# Patient Record
Sex: MALE | Race: ASIAN | NOT HISPANIC OR LATINO | Employment: FULL TIME | URBAN - METROPOLITAN AREA
[De-identification: names, ages, dates, MRNs, and addresses within clinical notes are randomized per-mention and may not be internally consistent; named-entity substitution may affect disease eponyms.]

---

## 2017-08-09 ENCOUNTER — OFFICE VISIT - HEALTHEAST (OUTPATIENT)
Dept: FAMILY MEDICINE | Facility: CLINIC | Age: 47
End: 2017-08-09

## 2017-08-09 DIAGNOSIS — J45.909 REACTIVE AIRWAY DISEASE, UNSPECIFIED ASTHMA SEVERITY, UNCOMPLICATED: ICD-10-CM

## 2017-08-09 DIAGNOSIS — J02.0 STREP PHARYNGITIS: ICD-10-CM

## 2017-08-09 DIAGNOSIS — J02.9 SORE THROAT: ICD-10-CM

## 2017-08-09 ASSESSMENT — MIFFLIN-ST. JEOR: SCORE: 1252.38

## 2017-11-07 ENCOUNTER — OFFICE VISIT - HEALTHEAST (OUTPATIENT)
Dept: FAMILY MEDICINE | Facility: CLINIC | Age: 47
End: 2017-11-07

## 2017-11-07 DIAGNOSIS — R53.83 FATIGUE: ICD-10-CM

## 2017-11-07 DIAGNOSIS — J40 BRONCHITIS: ICD-10-CM

## 2017-11-07 DIAGNOSIS — Z00.00 PREVENTATIVE HEALTH CARE: ICD-10-CM

## 2017-11-07 DIAGNOSIS — R05.9 COUGH: ICD-10-CM

## 2017-11-07 DIAGNOSIS — R09.1 PLEURISY: ICD-10-CM

## 2017-11-07 LAB
CHOLEST SERPL-MCNC: 162 MG/DL
FASTING STATUS PATIENT QL REPORTED: YES
HDLC SERPL-MCNC: 40 MG/DL
LDLC SERPL CALC-MCNC: 110 MG/DL
TRIGL SERPL-MCNC: 62 MG/DL

## 2018-02-22 ENCOUNTER — COMMUNICATION - HEALTHEAST (OUTPATIENT)
Dept: FAMILY MEDICINE | Facility: CLINIC | Age: 48
End: 2018-02-22

## 2018-02-22 DIAGNOSIS — R09.1 PLEURISY: ICD-10-CM

## 2018-03-26 ENCOUNTER — OFFICE VISIT - HEALTHEAST (OUTPATIENT)
Dept: FAMILY MEDICINE | Facility: CLINIC | Age: 48
End: 2018-03-26

## 2018-03-26 ENCOUNTER — AMBULATORY - HEALTHEAST (OUTPATIENT)
Dept: PULMONOLOGY | Facility: OTHER | Age: 48
End: 2018-03-26

## 2018-03-26 DIAGNOSIS — J98.01 BRONCHOSPASM: ICD-10-CM

## 2018-03-26 DIAGNOSIS — R06.02 SOB (SHORTNESS OF BREATH): ICD-10-CM

## 2018-03-26 DIAGNOSIS — R06.02 SHORTNESS OF BREATH: ICD-10-CM

## 2018-03-26 DIAGNOSIS — R09.1 PLEURISY: ICD-10-CM

## 2018-03-26 ASSESSMENT — MIFFLIN-ST. JEOR: SCORE: 1281.3

## 2018-05-15 ENCOUNTER — RECORDS - HEALTHEAST (OUTPATIENT)
Dept: PULMONOLOGY | Facility: OTHER | Age: 48
End: 2018-05-15

## 2018-05-15 ENCOUNTER — RECORDS - HEALTHEAST (OUTPATIENT)
Dept: ADMINISTRATIVE | Facility: OTHER | Age: 48
End: 2018-05-15

## 2018-05-15 ENCOUNTER — OFFICE VISIT - HEALTHEAST (OUTPATIENT)
Dept: PULMONOLOGY | Facility: OTHER | Age: 48
End: 2018-05-15

## 2018-05-15 DIAGNOSIS — R05.9 COUGH: ICD-10-CM

## 2018-05-15 DIAGNOSIS — R06.02 SHORTNESS OF BREATH: ICD-10-CM

## 2018-05-15 LAB — HGB BLD-MCNC: 16.1 G/DL

## 2018-05-15 ASSESSMENT — MIFFLIN-ST. JEOR: SCORE: 1288.11

## 2018-05-24 ENCOUNTER — HOSPITAL ENCOUNTER (OUTPATIENT)
Dept: RADIOLOGY | Facility: HOSPITAL | Age: 48
Discharge: HOME | End: 2018-05-24
Attending: INTERNAL MEDICINE

## 2018-05-24 DIAGNOSIS — R05.9 COUGH: ICD-10-CM

## 2018-12-03 ENCOUNTER — COMMUNICATION - HEALTHEAST (OUTPATIENT)
Dept: ADMINISTRATIVE | Facility: CLINIC | Age: 48
End: 2018-12-03

## 2018-12-03 ENCOUNTER — OFFICE VISIT - HEALTHEAST (OUTPATIENT)
Dept: FAMILY MEDICINE | Facility: CLINIC | Age: 48
End: 2018-12-03

## 2018-12-03 DIAGNOSIS — R73.03 PREDIABETES: ICD-10-CM

## 2018-12-03 DIAGNOSIS — J96.01 ACUTE RESPIRATORY FAILURE WITH HYPOXIA (H): ICD-10-CM

## 2018-12-03 ASSESSMENT — MIFFLIN-ST. JEOR: SCORE: 1283.57

## 2018-12-04 ENCOUNTER — COMMUNICATION - HEALTHEAST (OUTPATIENT)
Dept: CARE COORDINATION | Facility: CLINIC | Age: 48
End: 2018-12-04

## 2018-12-14 ENCOUNTER — OFFICE VISIT - HEALTHEAST (OUTPATIENT)
Dept: FAMILY MEDICINE | Facility: CLINIC | Age: 48
End: 2018-12-14

## 2018-12-14 DIAGNOSIS — Z76.0 ENCOUNTER FOR MEDICATION REFILL: ICD-10-CM

## 2018-12-14 DIAGNOSIS — J45.41 MODERATE PERSISTENT ASTHMA WITH ACUTE EXACERBATION: ICD-10-CM

## 2018-12-14 ASSESSMENT — MIFFLIN-ST. JEOR: SCORE: 1290.37

## 2018-12-17 ENCOUNTER — AMBULATORY - HEALTHEAST (OUTPATIENT)
Dept: FAMILY MEDICINE | Facility: CLINIC | Age: 48
End: 2018-12-17

## 2019-01-10 ENCOUNTER — OFFICE VISIT - HEALTHEAST (OUTPATIENT)
Dept: FAMILY MEDICINE | Facility: CLINIC | Age: 49
End: 2019-01-10

## 2019-01-10 ENCOUNTER — AMBULATORY - HEALTHEAST (OUTPATIENT)
Dept: FAMILY MEDICINE | Facility: CLINIC | Age: 49
End: 2019-01-10

## 2019-01-10 DIAGNOSIS — Z92.89 HOSPITALIZATION WITHIN LAST 30 DAYS: ICD-10-CM

## 2019-01-10 ASSESSMENT — MIFFLIN-ST. JEOR: SCORE: 1292.65

## 2019-01-11 ENCOUNTER — COMMUNICATION - HEALTHEAST (OUTPATIENT)
Dept: FAMILY MEDICINE | Facility: CLINIC | Age: 49
End: 2019-01-11

## 2019-01-22 ENCOUNTER — OFFICE VISIT - HEALTHEAST (OUTPATIENT)
Dept: PULMONOLOGY | Facility: OTHER | Age: 49
End: 2019-01-22

## 2019-01-22 DIAGNOSIS — J45.30 MILD PERSISTENT ASTHMA WITHOUT COMPLICATION: ICD-10-CM

## 2019-03-05 ENCOUNTER — OFFICE VISIT - HEALTHEAST (OUTPATIENT)
Dept: FAMILY MEDICINE | Facility: CLINIC | Age: 49
End: 2019-03-05

## 2019-03-05 DIAGNOSIS — E74.39 GLUCOSE INTOLERANCE: ICD-10-CM

## 2019-03-05 DIAGNOSIS — J45.20 INTERMITTENT ASTHMA WITHOUT COMPLICATION, UNSPECIFIED ASTHMA SEVERITY: ICD-10-CM

## 2019-03-05 DIAGNOSIS — K44.9 DIAPHRAGMATIC HERNIA WITHOUT OBSTRUCTION AND WITHOUT GANGRENE: ICD-10-CM

## 2019-03-05 ASSESSMENT — MIFFLIN-ST. JEOR: SCORE: 1303.99

## 2019-03-17 ENCOUNTER — COMMUNICATION - HEALTHEAST (OUTPATIENT)
Dept: FAMILY MEDICINE | Facility: CLINIC | Age: 49
End: 2019-03-17

## 2019-03-21 ENCOUNTER — AMBULATORY - HEALTHEAST (OUTPATIENT)
Dept: FAMILY MEDICINE | Facility: CLINIC | Age: 49
End: 2019-03-21

## 2019-03-21 DIAGNOSIS — J45.909 PERSISTENT ASTHMA WITHOUT COMPLICATION, UNSPECIFIED ASTHMA SEVERITY: ICD-10-CM

## 2019-03-22 ENCOUNTER — COMMUNICATION - HEALTHEAST (OUTPATIENT)
Dept: FAMILY MEDICINE | Facility: CLINIC | Age: 49
End: 2019-03-22

## 2019-10-18 ENCOUNTER — AMBULATORY - HEALTHEAST (OUTPATIENT)
Dept: CARE COORDINATION | Facility: CLINIC | Age: 49
End: 2019-10-18

## 2019-10-18 DIAGNOSIS — J45.21 MILD INTERMITTENT ASTHMA WITH ACUTE EXACERBATION: ICD-10-CM

## 2019-10-21 ENCOUNTER — COMMUNICATION - HEALTHEAST (OUTPATIENT)
Dept: NURSING | Facility: CLINIC | Age: 49
End: 2019-10-21

## 2019-10-22 ENCOUNTER — COMMUNICATION - HEALTHEAST (OUTPATIENT)
Dept: NURSING | Facility: CLINIC | Age: 49
End: 2019-10-22

## 2019-10-22 ENCOUNTER — COMMUNICATION - HEALTHEAST (OUTPATIENT)
Dept: CARE COORDINATION | Facility: CLINIC | Age: 49
End: 2019-10-22

## 2019-10-25 ENCOUNTER — OFFICE VISIT - HEALTHEAST (OUTPATIENT)
Dept: FAMILY MEDICINE | Facility: CLINIC | Age: 49
End: 2019-10-25

## 2019-10-25 DIAGNOSIS — K21.00 GASTROESOPHAGEAL REFLUX DISEASE WITH ESOPHAGITIS: ICD-10-CM

## 2019-10-25 DIAGNOSIS — R94.31 ABNORMAL ELECTROCARDIOGRAM: ICD-10-CM

## 2019-10-25 DIAGNOSIS — J45.901 MODERATE ASTHMA WITH EXACERBATION, UNSPECIFIED WHETHER PERSISTENT: ICD-10-CM

## 2019-10-25 DIAGNOSIS — J45.21 MILD INTERMITTENT ASTHMA WITH ACUTE EXACERBATION: ICD-10-CM

## 2019-10-25 DIAGNOSIS — J18.9 COMMUNITY ACQUIRED PNEUMONIA OF RIGHT LOWER LOBE OF LUNG: ICD-10-CM

## 2019-10-25 ASSESSMENT — MIFFLIN-ST. JEOR: SCORE: 1287.54

## 2019-10-29 ENCOUNTER — COMMUNICATION - HEALTHEAST (OUTPATIENT)
Dept: FAMILY MEDICINE | Facility: CLINIC | Age: 49
End: 2019-10-29

## 2019-11-01 ENCOUNTER — OFFICE VISIT - HEALTHEAST (OUTPATIENT)
Dept: FAMILY MEDICINE | Facility: CLINIC | Age: 49
End: 2019-11-01

## 2019-11-01 DIAGNOSIS — J45.909 PERSISTENT ASTHMA WITHOUT COMPLICATION, UNSPECIFIED ASTHMA SEVERITY: ICD-10-CM

## 2019-11-01 ASSESSMENT — MIFFLIN-ST. JEOR: SCORE: 1296.98

## 2019-11-08 ENCOUNTER — OFFICE VISIT - HEALTHEAST (OUTPATIENT)
Dept: CARDIOLOGY | Facility: CLINIC | Age: 49
End: 2019-11-08

## 2019-11-08 DIAGNOSIS — R07.89 OTHER CHEST PAIN: ICD-10-CM

## 2019-11-08 DIAGNOSIS — R06.09 OTHER FORMS OF DYSPNEA: ICD-10-CM

## 2019-11-08 DIAGNOSIS — R06.09 DYSPNEA ON EXERTION: ICD-10-CM

## 2019-11-08 ASSESSMENT — MIFFLIN-ST. JEOR: SCORE: 1299.45

## 2019-11-12 ENCOUNTER — OFFICE VISIT - HEALTHEAST (OUTPATIENT)
Dept: FAMILY MEDICINE | Facility: CLINIC | Age: 49
End: 2019-11-12

## 2019-11-12 DIAGNOSIS — J45.901 MODERATE ASTHMA WITH EXACERBATION, UNSPECIFIED WHETHER PERSISTENT: ICD-10-CM

## 2019-11-12 ASSESSMENT — MIFFLIN-ST. JEOR: SCORE: 1299.45

## 2019-11-22 ENCOUNTER — HOSPITAL ENCOUNTER (OUTPATIENT)
Dept: NUCLEAR MEDICINE | Facility: HOSPITAL | Age: 49
Discharge: HOME | End: 2019-11-22
Attending: INTERNAL MEDICINE

## 2019-11-22 ENCOUNTER — HOSPITAL ENCOUNTER (OUTPATIENT)
Dept: CARDIOLOGY | Facility: HOSPITAL | Age: 49
Discharge: HOME | End: 2019-11-22
Attending: INTERNAL MEDICINE

## 2019-11-22 DIAGNOSIS — R06.09 OTHER FORMS OF DYSPNEA: ICD-10-CM

## 2019-11-22 DIAGNOSIS — R07.89 OTHER CHEST PAIN: ICD-10-CM

## 2019-11-22 DIAGNOSIS — R06.09 DYSPNEA ON EXERTION: ICD-10-CM

## 2019-11-22 LAB
AORTIC ROOT: 3.4 CM
AORTIC VALVE MEAN VELOCITY: 73 CM/S
AV DIMENSIONLESS INDEX VTI: 1.1
AV MEAN GRADIENT: 2 MMHG
AV PEAK GRADIENT: 4.7 MMHG
AV VALVE AREA: 3.6 CM2
BSA FOR ECHO PROCEDURE: 1.59 M2
CV BLOOD PRESSURE: ABNORMAL MMHG
CV ECHO HEIGHT: 59 IN
CV ECHO WEIGHT: 134 LBS
CV STRESS CURRENT BP HE: NORMAL
CV STRESS CURRENT HR HE: 110
CV STRESS CURRENT HR HE: 113
CV STRESS CURRENT HR HE: 114
CV STRESS CURRENT HR HE: 115
CV STRESS CURRENT HR HE: 116
CV STRESS CURRENT HR HE: 117
CV STRESS CURRENT HR HE: 118
CV STRESS CURRENT HR HE: 118
CV STRESS CURRENT HR HE: 119
CV STRESS CURRENT HR HE: 121
CV STRESS CURRENT HR HE: 121
CV STRESS CURRENT HR HE: 122
CV STRESS CURRENT HR HE: 124
CV STRESS CURRENT HR HE: 124
CV STRESS CURRENT HR HE: 125
CV STRESS CURRENT HR HE: 126
CV STRESS CURRENT HR HE: 127
CV STRESS CURRENT HR HE: 131
CV STRESS CURRENT HR HE: 134
CV STRESS CURRENT HR HE: 135
CV STRESS CURRENT HR HE: 138
CV STRESS CURRENT HR HE: 139
CV STRESS CURRENT HR HE: 140
CV STRESS CURRENT HR HE: 146
CV STRESS CURRENT HR HE: 146
CV STRESS CURRENT HR HE: 147
CV STRESS CURRENT HR HE: 96
CV STRESS CURRENT HR HE: 96
CV STRESS DEVIATION TIME HE: NORMAL
CV STRESS ECHO PERCENT HR HE: NORMAL
CV STRESS EXERCISE STAGE HE: NORMAL
CV STRESS EXERCISE STAGE REACHED HE: NORMAL
CV STRESS FINAL RESTING BP HE: NORMAL
CV STRESS FINAL RESTING HR HE: 113
CV STRESS MAX HR HE: 147
CV STRESS MAX TREADMILL GRADE HE: 12
CV STRESS MAX TREADMILL SPEED HE: 2.5
CV STRESS PEAK DIA BP HE: NORMAL
CV STRESS PEAK SYS BP HE: NORMAL
CV STRESS PHASE HE: NORMAL
CV STRESS PROTOCOL HE: NORMAL
CV STRESS RESTING PT POSITION HE: NORMAL
CV STRESS RESTING PT POSITION HE: NORMAL
CV STRESS ST DEVIATION AMOUNT HE: NORMAL
CV STRESS ST DEVIATION ELEVATION HE: NORMAL
CV STRESS ST EVELATION AMOUNT HE: NORMAL
CV STRESS TEST TYPE HE: NORMAL
CV STRESS TOTAL STAGE TIME MIN 1 HE: NORMAL
DOP CALC AO PEAK VEL: 108 CM/S
DOP CALC AO VTI: 13.7 CM
DOP CALC LVOT AREA: 3.14 CM2
DOP CALC LVOT DIAMETER: 2 CM
DOP CALC LVOT STROKE VOLUME: 49.3 CM3
DOP CALC MV VTI: 14.1 CM
DOP CALCLVOT PEAK VEL VTI: 15.7 CM
ECHO EJECTION FRACTION ESTIMATED: 70 %
EJECTION FRACTION: 57 % (ref 55–75)
FRACTIONAL SHORTENING: 35.3 % (ref 28–44)
INTERVENTRICULAR SEPTUM IN END DIASTOLE: 1 CM (ref 0.6–1)
IVS/PW RATIO: 0.9
LA AREA 1: 11.2 CM2
LA AREA 2: 12.7 CM2
LEFT ATRIUM LENGTH: 5.13 CM
LEFT ATRIUM SIZE: 3.8 CM
LEFT ATRIUM TO AORTIC ROOT RATIO: 1.12 NO UNITS
LEFT ATRIUM VOLUME INDEX: 14.8 ML/M2
LEFT ATRIUM VOLUME: 23.6 ML
LEFT VENTRICLE CARDIAC INDEX: 3.1 L/MIN/M2
LEFT VENTRICLE CARDIAC OUTPUT: 5 L/MIN
LEFT VENTRICLE DIASTOLIC VOLUME INDEX: 38.4 CM3/M2 (ref 34–74)
LEFT VENTRICLE DIASTOLIC VOLUME: 61 CM3 (ref 62–150)
LEFT VENTRICLE HEART RATE: 101 BPM
LEFT VENTRICLE MASS INDEX: 66.9 G/M2
LEFT VENTRICLE SYSTOLIC VOLUME INDEX: 16.4 CM3/M2 (ref 11–31)
LEFT VENTRICLE SYSTOLIC VOLUME: 26 CM3 (ref 21–61)
LEFT VENTRICULAR INTERNAL DIMENSION IN DIASTOLE: 3.4 CM (ref 4.2–5.8)
LEFT VENTRICULAR INTERNAL DIMENSION IN SYSTOLE: 2.2 CM (ref 2.5–4)
LEFT VENTRICULAR MASS: 106.3 G
LEFT VENTRICULAR OUTFLOW TRACT MEAN GRADIENT: 2 MMHG
LEFT VENTRICULAR OUTFLOW TRACT MEAN VELOCITY: 61.2 CM/S
LEFT VENTRICULAR POSTERIOR WALL IN END DIASTOLE: 1.1 CM (ref 0.6–1)
LV STROKE VOLUME INDEX: 31 ML/M2
MITRAL VALVE E/A RATIO: 0.9
MITRAL VALVE MEAN INFLOW VELOCITY: 74 CM/S
MITRAL VALVE PEAK VELOCITY: 99.3 CM/S
MV AREA VTI: 3.5 CM2
MV AVERAGE E/E' RATIO: 13.4 CM/S
MV DECELERATION TIME: 132 MS
MV E'TISSUE VEL-LAT: 6.52 CM/S
MV E'TISSUE VEL-MED: 5.07 CM/S
MV LATERAL E/E' RATIO: 11.9
MV MEAN GRADIENT: 2 MMHG
MV MEDIAL E/E' RATIO: 15.4
MV PEAK A VELOCITY: 88.8 CM/S
MV PEAK E VELOCITY: 77.9 CM/S
MV PEAK GRADIENT: 3.9 MMHG
MV VALVE AREA BY CONTINUITY EQUATION: 3.5 CM2
NUC REST DIASTOLIC VOLUME INDEX: 2144 LBS
NUC REST SYSTOLIC VOLUME INDEX: 59 IN
NUC STRESS EJECTION FRACTION: 75 %
PR MAX PG: 2 MMHG
PR PEAK VELOCITY: 76 CM/S
PV PEAK GRADIENT: 3.4 MMHG
PV VMAX: 91.7 CM/S
RATE PRESSURE PRODUCT: NORMAL
STRESS ECHO BASELINE DIASTOLIC HE: 64
STRESS ECHO BASELINE HR: 96
STRESS ECHO BASELINE SYSTOLIC BP: 126
STRESS ECHO CALCULATED PERCENT HR: 85 %
STRESS ECHO LAST STRESS DIASTOLIC BP: 62
STRESS ECHO LAST STRESS HR: 146
STRESS ECHO LAST STRESS SYSTOLIC BP: 156
STRESS ECHO POST ESTIMATED WORKLOAD: 7.1
STRESS ECHO POST EXERCISE DUR MIN: 6
STRESS ECHO POST EXERCISE DUR SEC: 0
STRESS ECHO TARGET HR: 172
TRICUSPID REGURGITATION PEAK PRESSURE GRADIENT: 13.8 MMHG
TRICUSPID VALVE ANULAR PLANE SYSTOLIC EXCURSION: 1.7 CM
TRICUSPID VALVE PEAK REGURGITANT VELOCITY: 186 CM/S

## 2019-11-22 ASSESSMENT — MIFFLIN-ST. JEOR: SCORE: 1299.45

## 2019-11-25 ENCOUNTER — OFFICE VISIT - HEALTHEAST (OUTPATIENT)
Dept: PULMONOLOGY | Facility: OTHER | Age: 49
End: 2019-11-25

## 2019-11-25 DIAGNOSIS — J45.21 EXACERBATION OF INTERMITTENT ASTHMA, UNSPECIFIED ASTHMA SEVERITY: ICD-10-CM

## 2019-12-03 ENCOUNTER — OFFICE VISIT - HEALTHEAST (OUTPATIENT)
Dept: CARDIOLOGY | Facility: CLINIC | Age: 49
End: 2019-12-03

## 2019-12-03 DIAGNOSIS — R06.09 DYSPNEA ON EXERTION: ICD-10-CM

## 2019-12-03 ASSESSMENT — MIFFLIN-ST. JEOR: SCORE: 1285.84

## 2019-12-10 ENCOUNTER — COMMUNICATION - HEALTHEAST (OUTPATIENT)
Dept: FAMILY MEDICINE | Facility: CLINIC | Age: 49
End: 2019-12-10

## 2019-12-10 DIAGNOSIS — J45.21 MILD INTERMITTENT ASTHMA WITH ACUTE EXACERBATION: ICD-10-CM

## 2019-12-27 ENCOUNTER — OFFICE VISIT - HEALTHEAST (OUTPATIENT)
Dept: PULMONOLOGY | Facility: OTHER | Age: 49
End: 2019-12-27

## 2019-12-27 DIAGNOSIS — J45.909 ASTHMA DEPENDENT ON INHALED STEROIDS: ICD-10-CM

## 2019-12-27 DIAGNOSIS — Z79.51 ASTHMA DEPENDENT ON INHALED STEROIDS: ICD-10-CM

## 2019-12-27 LAB
BASOPHILS # BLD AUTO: 0.1 THOU/UL (ref 0–0.2)
BASOPHILS NFR BLD AUTO: 1 % (ref 0–2)
EOSINOPHIL # BLD AUTO: 0.9 THOU/UL (ref 0–0.4)
EOSINOPHIL NFR BLD AUTO: 12 % (ref 0–6)
ERYTHROCYTE [DISTWIDTH] IN BLOOD BY AUTOMATED COUNT: 12.2 % (ref 11–14.5)
HCT VFR BLD AUTO: 46.5 % (ref 40–54)
HGB BLD-MCNC: 15.2 G/DL (ref 14–18)
LYMPHOCYTES # BLD AUTO: 1.8 THOU/UL (ref 0.8–4.4)
LYMPHOCYTES NFR BLD AUTO: 22 % (ref 20–40)
MCH RBC QN AUTO: 29.1 PG (ref 27–34)
MCHC RBC AUTO-ENTMCNC: 32.7 G/DL (ref 32–36)
MCV RBC AUTO: 89 FL (ref 80–100)
MONOCYTES # BLD AUTO: 0.6 THOU/UL (ref 0–0.9)
MONOCYTES NFR BLD AUTO: 7 % (ref 2–10)
NEUTROPHILS # BLD AUTO: 4.7 THOU/UL (ref 2–7.7)
NEUTROPHILS NFR BLD AUTO: 58 % (ref 50–70)
PLATELET # BLD AUTO: 204 THOU/UL (ref 140–440)
PMV BLD AUTO: 10.1 FL (ref 8.5–12.5)
RBC # BLD AUTO: 5.22 MILL/UL (ref 4.4–6.2)
WBC: 8.1 THOU/UL (ref 4–11)

## 2019-12-27 RX ORDER — MONTELUKAST SODIUM 10 MG/1
10 TABLET ORAL AT BEDTIME
Qty: 60 TABLET | Refills: 3 | Status: SHIPPED | OUTPATIENT
Start: 2019-12-27 | End: 2023-09-27

## 2019-12-31 LAB — TOTAL IGE - HISTORICAL: 3415 KU/L (ref 0–100)

## 2020-01-07 ENCOUNTER — COMMUNICATION - HEALTHEAST (OUTPATIENT)
Dept: FAMILY MEDICINE | Facility: CLINIC | Age: 50
End: 2020-01-07

## 2020-02-04 ENCOUNTER — OFFICE VISIT - HEALTHEAST (OUTPATIENT)
Dept: FAMILY MEDICINE | Facility: CLINIC | Age: 50
End: 2020-02-04

## 2020-02-04 DIAGNOSIS — J45.20 INTERMITTENT ASTHMA WITHOUT COMPLICATION, UNSPECIFIED ASTHMA SEVERITY: ICD-10-CM

## 2020-02-04 ASSESSMENT — MIFFLIN-ST. JEOR: SCORE: 1281.31

## 2020-02-11 ENCOUNTER — OFFICE VISIT - HEALTHEAST (OUTPATIENT)
Dept: PULMONOLOGY | Facility: OTHER | Age: 50
End: 2020-02-11

## 2020-02-11 DIAGNOSIS — J45.909 ASTHMA WITHOUT ACUTE EXACERBATION: ICD-10-CM

## 2020-02-11 ASSESSMENT — MIFFLIN-ST. JEOR: SCORE: 1290.38

## 2020-05-18 ENCOUNTER — COMMUNICATION - HEALTHEAST (OUTPATIENT)
Dept: FAMILY MEDICINE | Facility: CLINIC | Age: 50
End: 2020-05-18

## 2020-05-18 DIAGNOSIS — J45.21 MILD INTERMITTENT ASTHMA WITH ACUTE EXACERBATION: ICD-10-CM

## 2020-05-20 RX ORDER — BUDESONIDE 0.25 MG/2ML
INHALANT ORAL
Qty: 100 ML | Refills: 2 | Status: SHIPPED | OUTPATIENT
Start: 2020-05-20 | End: 2022-10-26

## 2020-10-23 ENCOUNTER — COMMUNICATION - HEALTHEAST (OUTPATIENT)
Dept: FAMILY MEDICINE | Facility: CLINIC | Age: 50
End: 2020-10-23

## 2020-10-23 DIAGNOSIS — J45.21 MILD INTERMITTENT ASTHMA WITH ACUTE EXACERBATION: ICD-10-CM

## 2020-10-25 RX ORDER — ALBUTEROL SULFATE 0.83 MG/ML
SOLUTION RESPIRATORY (INHALATION)
Qty: 90 ML | Refills: 2 | Status: SHIPPED | OUTPATIENT
Start: 2020-10-25 | End: 2022-06-21

## 2020-11-24 ENCOUNTER — AMBULATORY - HEALTHEAST (OUTPATIENT)
Dept: PULMONOLOGY | Facility: OTHER | Age: 50
End: 2020-11-24

## 2020-11-24 DIAGNOSIS — J45.21 EXACERBATION OF INTERMITTENT ASTHMA, UNSPECIFIED ASTHMA SEVERITY: ICD-10-CM

## 2021-02-12 ENCOUNTER — COMMUNICATION - HEALTHEAST (OUTPATIENT)
Dept: FAMILY MEDICINE | Facility: CLINIC | Age: 51
End: 2021-02-12

## 2021-02-12 DIAGNOSIS — K21.00 GASTROESOPHAGEAL REFLUX DISEASE WITH ESOPHAGITIS: ICD-10-CM

## 2021-02-26 ENCOUNTER — COMMUNICATION - HEALTHEAST (OUTPATIENT)
Dept: CARDIOLOGY | Facility: CLINIC | Age: 51
End: 2021-02-26

## 2021-03-01 ENCOUNTER — OFFICE VISIT - HEALTHEAST (OUTPATIENT)
Dept: CARDIOLOGY | Facility: CLINIC | Age: 51
End: 2021-03-01

## 2021-03-01 DIAGNOSIS — R06.00 DYSPNEA: ICD-10-CM

## 2021-03-01 ASSESSMENT — MIFFLIN-ST. JEOR: SCORE: 1303.99

## 2021-04-10 ENCOUNTER — AMBULATORY - HEALTHEAST (OUTPATIENT)
Dept: NURSING | Facility: CLINIC | Age: 51
End: 2021-04-10

## 2021-05-21 ENCOUNTER — AMBULATORY - HEALTHEAST (OUTPATIENT)
Dept: PULMONOLOGY | Facility: OTHER | Age: 51
End: 2021-05-21

## 2021-05-21 ENCOUNTER — COMMUNICATION - HEALTHEAST (OUTPATIENT)
Dept: FAMILY MEDICINE | Facility: CLINIC | Age: 51
End: 2021-05-21

## 2021-05-21 DIAGNOSIS — J45.21 EXACERBATION OF INTERMITTENT ASTHMA, UNSPECIFIED ASTHMA SEVERITY: ICD-10-CM

## 2021-05-21 DIAGNOSIS — J45.21 MILD INTERMITTENT ASTHMA WITH EXACERBATION: ICD-10-CM

## 2021-05-21 RX ORDER — FLUTICASONE PROPIONATE AND SALMETEROL XINAFOATE 230; 21 UG/1; UG/1
2 AEROSOL, METERED RESPIRATORY (INHALATION) 2 TIMES DAILY
Qty: 1 INHALER | Refills: 0 | Status: SHIPPED | OUTPATIENT
Start: 2021-05-21 | End: 2022-10-26

## 2021-05-24 RX ORDER — ALBUTEROL SULFATE 90 UG/1
AEROSOL, METERED RESPIRATORY (INHALATION)
Qty: 18 G | Refills: 6 | Status: SHIPPED | OUTPATIENT
Start: 2021-05-24 | End: 2022-08-09

## 2021-05-26 ENCOUNTER — RECORDS - HEALTHEAST (OUTPATIENT)
Dept: ADMINISTRATIVE | Facility: CLINIC | Age: 51
End: 2021-05-26

## 2021-05-26 NOTE — TELEPHONE ENCOUNTER
Fax received from [facility], they have started the Prior Authorization Process via Cover My Meds    CoverMyMeds Key: [id number]    Medication Name: Symbicort 160-4.5mcg inhaler    Insurance Plan: [facility]  PBM:   Patient ID: not provided on fax    Please complete the PA process

## 2021-05-27 ENCOUNTER — RECORDS - HEALTHEAST (OUTPATIENT)
Dept: ADMINISTRATIVE | Facility: CLINIC | Age: 51
End: 2021-05-27

## 2021-05-28 ASSESSMENT — ASTHMA QUESTIONNAIRES
ACT_TOTALSCORE: 7
ACT_TOTALSCORE: 12
ACT_TOTALSCORE: 6

## 2021-05-31 VITALS — BODY MASS INDEX: 27.85 KG/M2 | WEIGHT: 132.13 LBS

## 2021-05-31 VITALS — WEIGHT: 128 LBS | BODY MASS INDEX: 25.13 KG/M2 | HEIGHT: 60 IN

## 2021-06-01 ENCOUNTER — RECORDS - HEALTHEAST (OUTPATIENT)
Dept: ADMINISTRATIVE | Facility: CLINIC | Age: 51
End: 2021-06-01

## 2021-06-01 VITALS — BODY MASS INDEX: 26.5 KG/M2 | HEIGHT: 60 IN | WEIGHT: 135 LBS

## 2021-06-01 VITALS — WEIGHT: 133.5 LBS | BODY MASS INDEX: 26.21 KG/M2 | HEIGHT: 60 IN

## 2021-06-02 VITALS — HEIGHT: 60 IN | BODY MASS INDEX: 26.6 KG/M2 | WEIGHT: 135.5 LBS

## 2021-06-02 VITALS — BODY MASS INDEX: 26.31 KG/M2 | HEIGHT: 60 IN | WEIGHT: 134 LBS

## 2021-06-02 VITALS — WEIGHT: 132.6 LBS | BODY MASS INDEX: 26.78 KG/M2

## 2021-06-02 VITALS — WEIGHT: 135 LBS | BODY MASS INDEX: 26.5 KG/M2 | HEIGHT: 60 IN

## 2021-06-02 VITALS — HEIGHT: 60 IN | BODY MASS INDEX: 26.01 KG/M2 | WEIGHT: 132.5 LBS

## 2021-06-02 NOTE — PROGRESS NOTES
Attempt 1, [facility] CHW, [place] called patient upon PCP referred for potential enrolling patient for [facility]. CHW left patient a voice mail to return call with direct number, please transfer patient to ext at [phone] if patient calls back.

## 2021-06-02 NOTE — PROGRESS NOTES
"TCM DISCHARGE FOLLOW UP CALL    Discharge Date:  10/18/2019  Reason for hospital stay (discharge diagnosis)::  Dyspnea, Acute asthma exacerbation  Are you feeling better, the same or worse since your discharge?:  Patient is feeling better (Still some wheezing, and STACK going up and down the stairs.  Coughing up \"really sticky should be ok, but when coughing not getting up that sticky stuff, then I get sob.\")  Do you feel like you have a plan in the event of a health emergency?: Yes (ER)    As part of your discharge plan, were  home care services ordered for you?: No    Did you receive any new medications, or was there a change to your medications?: Yes    Are you taking those medications, or do you have any established regiment?:  Pt states he completed the 5 days of 40 mg of prednisone, now taking 20 mg once a day; has another 2-3 days left.  He completed course of Zithromax.  Aware he's to start Advair HFA inhaler, 2 puffs two times a day, on 10/28/19, after completing prednisone.  States he finished the bottle of Delsym cough syrup today, notes this has helped w/his cough.  He's using his Albuterol inhaler PRN.  Do you have any follow up visits scheduled with your PCP or Specialist?:  Yes, with PCP  (RN) Is PCP appt scheduled soon enough (within 14 days of discharge date)?: Yes (Dr. Archibald 10/25/19)    RN NOTES::  Reminded pt to call and schedule 4 week f/u appt w/pulmonologist.  Advised using his Albuterol inhaler 2-3 times a day for coughing and wheezing, especially as he's still recovering from recent asthma exacerbation, to open airways and make cough more productive, as well as help w/STACK during activiy.  Pt verbalized understanding & agrees w/plan.      Krys Hill RN Care Manager, Population Health    "

## 2021-06-02 NOTE — PROGRESS NOTES
The clinic Community Health Worker talked with the patient today at the request of the PCP to discuss possible Clinic Care Coordination enrollment.  The service was described to the patient and immediate needs were discussed.  The patient declined enrollement at this time.  The PCP is encouraged to refer in the future if the patient's needs change.    Mary reports he has the phone number for the pulmonologist and does not need it. He reports he knows he has a PCP appointment this week Friday 10/25. He states he has no concerns about how to fill his medications.

## 2021-06-02 NOTE — TELEPHONE ENCOUNTER
Prior Authorization Request  Who s requesting:  Pharmacy  Pharmacy Name and Location: [facility]  Medication Name:   omeprazole (PRILOSEC) 20 MG capsule 30 capsule 11 10/25/2019  --   Sig - Route: Take 1 capsule (20 mg total) by mouth daily. - Oral       Insurance Plan: [employer]  Insurance Member ID Number:  [id number]  Informed patient that prior authorizations can take up to 10 business days for response:   No  Okay to leave a detailed message: Yes    Key [id number]

## 2021-06-02 NOTE — PATIENT INSTRUCTIONS - HE
GERD:     Refilled omeprazole.    Abnormal EKG:     Referral given to Cardiology.    Pneumonia:     Antibiotic has been completed.    Asthma:     Prescription given for new nebulizer machine.    Refilled Pulmicort nebs, 1 treatment in the morning and 1 treatment in the evening.    Refilled albuterol nebs to be given once every 4-6 hours. This may cause some heart racing.     Follow up:     Return in 1 week to see Dr. Goodson.

## 2021-06-02 NOTE — PROGRESS NOTES
ASSESSMENT AND PLAN:  1. Moderate asthma with exacerbation, unspecified whether persistent  Patient still has shortness of breath still has active wheezing pulmonology referral has not been made a schedule that for him today.  - Ambulatory referral to Pulmonology    2. Mild intermittent asthma with acute exacerbation    His nebulizer machine may not be functioning I have prescribed albuterol and Pulmicort nebs for him.  He says he has no shortness of breath well being seated he would like to return to work I filled out a work form for him.  He understands that if he takes his inhalers and nebulizing medication he may have some side effects.  - albuterol (PROVENTIL) 2.5 mg /3 mL (0.083 %) nebulizer solution; Take 3 mL (2.5 mg total) by nebulization every 4 (four) hours as needed for wheezing.  Dispense: 25 vial; Refill: 2  - budesonide (PULMICORT) 0.25 mg/2 mL nebulizer solution; Take 2 mL (0.25 mg total) by nebulization 2 (two) times a day.  Dispense: 100 mL; Refill: 2  - Ambulatory referral to Pulmonology    3. Community acquired pneumonia of right lower lobe of lung (H)  Finished azithromycin no fever no chills appetite returning.    4. Gastroesophageal reflux disease with esophagitis    Refilled omeprazole.  - omeprazole (PRILOSEC) 20 MG capsule; Take 1 capsule (20 mg total) by mouth daily.  Dispense: 30 capsule; Refill: 11    5. Abnormal electrocardiogram    EKG reviewed note from 2018 evidence of inferior wall ischemia noted cardiology consult, no chest pain syncopal episodes noted by patient.  - Ambulatory referral to Cardiology            Orders Placed This Encounter   Procedures     Ambulatory referral to Cardiology     Referral Priority:   Routine     Referral Type:   Consultation     Referral Reason:   Evaluation and Treatment     Requested Specialty:   Cardiology     Number of Visits Requested:   1     Ambulatory referral to Pulmonology     Referral Priority:   Routine     Referral Type:   Consultation      Referral Reason:   Evaluation and Treatment     Number of Visits Requested:   1     There are no discontinued medications.    No follow-ups on file.    CHIEF COMPLAINT:  Chief Complaint   Patient presents with     Hospital Visit Follow Up     [facility] 10-16-10/18 Chest pain        HISTORY OF PRESENT ILLNESS:  Mary is a 48 y.o. male who follows with [facility] Pulmonology for chronic left-sided fibrothorax, with asthma, hiatal hernia, remote history of treatment for latent TB in 1985, who presents to the clinic today for inpatient follow up. He was admitted to [facility] from 10/16/2019 to 10/18/2019 for shortness of breath after running out of refills for his Advair. Exam was significant for coarse breath sounds bilaterally with wheezing, poor air movement, febrile, and tachycardia. Chest x-rays showed parenchymal scarring and volume loss with consolidation left lower lung, likely chronic in nature. There was suspected acute infiltrate on the left. EKG showed sinus tachycardia with possible inferior infarct, and was otherwise unchanged from previous. Echo ultrasound showed pericardial effusion and was otherwise normal. The patient was treated with IV steroids and Zithromax, and was discharged with oral steroid taper and Zithromax. Advair was prescribed for asthma maintenance.     At this point, Mary is still having wheezing as well as a productive cough at night. His cough is disturbing his sleep. His fever has resolved. His ACT score today is 7. He has a nebulizer which he got more than a year ago. It does not seem to be functioning properly, and it is taking much longer to finish a neb. The patient has two more days of the steroid taper left. He endorses some dyspnea on exertion but otherwise no exertional chest pain. There has been no abdominal pain, nausea, emesis, or diarrhea. He does not eat spicy food.     REVIEW OF SYSTEMS:   General: Afebrile.  Respiratory: Wheezing, cough, and  "dyspnea on exertion.  CV: No exertional chest pain.  GI: No abdominal pain, nausea, emesis, or diarrhea.  All other 12 point review of systems are negative.    PFSH:  He is  and accompanied by his wife today. Reviewed as below.     TOBACCO USE:  Social History     Tobacco Use   Smoking Status Never Smoker   Smokeless Tobacco Never Used   Tobacco Comment    no passive exposure       VITALS:  Vitals:    10/25/19 0858   BP: 108/76   Patient Site: Right Arm   Patient Position: Sitting   Cuff Size: Adult Regular   Pulse: 91   Temp: 97.8  F (36.6  C)   TempSrc: Oral   SpO2: 96%   Weight: 131 lb 6 oz (59.6 kg)   Height: 4' 11\" (1.499 m)     Wt Readings from Last 3 Encounters:   10/25/19 131 lb 6 oz (59.6 kg)   10/18/19 134 lb 6.4 oz (61 kg)   03/05/19 135 lb (61.2 kg)     Body mass index is 26.53 kg/m .      PHYSICAL EXAM:  General: Alert, cooperative, no distress, appears stated age  Head: Normocephalic, without obvious abnormality, atraumatic  Eyes: PERRL, conjunctiva/cornea clear, EOM's intact, moist mucous membranes   Neck: Supple, no cervical lymph node enlargement   Lungs: Inspiratory rales at bilateral bases and apices  Heart: Regular rate and rhythm, S1 and S2 normal, no murmur, rub, or gallop  Neurologic:  A & O x 3.  No tremor, no focal findings.    DATA REVIEWED:  Additional History from Old Records Summarized (2): Reviewed [facility] 10/18/2019 discharge summary for pneumonia and asthma exacerbation.  Decision to Obtain Records (1): none  Radiology Tests Summarized or Ordered (1): 10/16/2019 Chest x-rays showed parenchymal scarring and volume loss with consolidation left lower lung, likely chronic in nature. There was suspected acute infiltrate on the left.  Labs Reviewed or Ordered (1): [facility] labs reviewed significant for leukocytosis.  Medicine Test Summarized or Ordered (1): 10/16/2019 EKG showed sinus tachycardia with possible inferior infarct, and was otherwise unchanged from " previous.  Independent Review of EKG or X-RAY(2 each): none    The visit lasted a total of 21 minutes face to face with the patient. Over 50% of the time was spent counseling and educating the patient about asthma and pneumonia.    I, Irma Chavez, am scribing for and in the presence of, Dr. Archibald.    I, Dr. Archibald, personally performed the services described in this documentation, as scribed by Irma Chavez in my presence, and it is both accurate and complete.      MEDICATIONS:  Current Outpatient Medications   Medication Sig Dispense Refill     dextromethorphan (DELSYM) 30 mg/5 mL liquid Take 5 mL (30 mg total) by mouth every 12 (twelve) hours as needed for cough. 89 mL 0     predniSONE (DELTASONE) 20 MG tablet Take 40 mg by mouth daily with lunch for 5 days, THEN 20 mg daily with lunch for 5 days. 15 tablet 0     albuterol (PROAIR HFA;PROVENTIL HFA;VENTOLIN HFA) 90 mcg/actuation inhaler Inhale 2 puffs every 4 (four) hours as needed for wheezing. 2 Inhaler 6     albuterol (PROVENTIL) 2.5 mg /3 mL (0.083 %) nebulizer solution Take 3 mL (2.5 mg total) by nebulization every 4 (four) hours as needed for wheezing. 25 vial 2     budesonide (PULMICORT) 0.25 mg/2 mL nebulizer solution Take 2 mL (0.25 mg total) by nebulization 2 (two) times a day. 100 mL 2     [START ON 10/28/2019] fluticasone propion-salmeterol (ADVAIR HFA) 115-21 mcg/actuation inhaler Inhale 2 puffs 2 (two) times a day. 1 Inhaler 12     omeprazole (PRILOSEC) 20 MG capsule Take 1 capsule (20 mg total) by mouth daily. 30 capsule 11     TRUEPLUS LANCETS 30 gauge Misc USE AS DIRECTED ONCE DAILY    1 HNUB SIV 1 ZAUG RAWS LI QHIA  0     No current facility-administered medications for this visit.        Total Data Points: 5    Please note that this clinical encounter uses voice recognition software, there may be typographical errors present

## 2021-06-02 NOTE — PROGRESS NOTES
ASSESMENT AND PLAN:  1. Persistent asthma without complication, unspecified asthma severity  -  ACT=7 on 10/25.   Pt reports no changes.   -  Pt NAD, SpO2 94%; Pt decline DuoNeb and state he feels fine.  -  Pt is currently on:    - Advir 115-21, two puffs two times a day   - Pulmicort 0.25mg/2mL nebulizer, he states using two times a day PRN.   - Albuterol 2.5/3mL Nebulizer solution, PRN; He also Albuterol inhaler he carries with him.   -  When asked about his technique, Pt did not show correct technique and meds not even entering lungs effectively.   -  Will D/C Albuterol and ADD RESPIMAT (see below).  -   Pt has appt with Pulmonology on 11/25.    Orders:   - ipratropium-albuterol (COMBIVENT RESPIMAT)  mcg/actuation Mist inhaler; Inhale 1 puff every 4 hours as needed.  Dispense: 1 Inhaler; Refill: 11     2. Health maintenance  - Decline HIV screening.     Reviewed Medical/Social history and Medications.  See new changes above.   Over 25 minutes total time spent with patient, with more than 50% in counseling and coordination of care on the above issues.   Discussed indications for emergent medical attention and routine F/u.  Patient/Parent/Guardian engaged in decision making process and verbalized understanding of the options discussed and agreed with the final treatment plan.     SUBJECTIVE:  Mary Arguello is a 48 y.o. male who presents  for evaluation of his Asthma.    Pt reports no changes in his wheezing and meds are not helping.  Pt was recently placed on Pulmicort on last OV, but it appears Pt is not using correct technique.  Went over how to use proper technique. Also went over Denies fever/chills, wheezing, SOB, CP, n/v.     Offered in clinic DuoNeb given his O2sat was 94%, but Pt decline and states he feels fine. Discussed and reviewed Asthmas triggers and indications for emergent f/u.     ROS:  Comprehensive Review of Systems Negative except stated in HPI.     Past Medical History:   Diagnosis Date      Fibrothorax     Scarring left lung     Hiatal hernia      Pneumonia      TB lung, latent 1985     Patient Active Problem List   Diagnosis     Anaphylaxis     Hiatal Hernia     Chest Pain     Nonspecific reaction to tuberculin skin test without active tuberculosis     Left Ventricular Hypertrophy     Diastolic Dysfunction     Primary Vitiligo     Fibrothorax     Acute asthma exacerbation     Acute respiratory failure with hypoxia (H)     Hyperglycemia     Tachycardia     Asthma exacerbation attacks     Community acquired pneumonia of right lower lobe of lung (H)     Dyspnea     Hypokalemia     Moderate asthma with exacerbation, unspecified whether persistent     Aspiration pneumonitis (H)     Current Outpatient Medications   Medication Sig Dispense Refill     albuterol (PROAIR HFA;PROVENTIL HFA;VENTOLIN HFA) 90 mcg/actuation inhaler Inhale 2 puffs every 4 (four) hours as needed for wheezing. 2 Inhaler 6     albuterol (PROVENTIL) 2.5 mg /3 mL (0.083 %) nebulizer solution Take 3 mL (2.5 mg total) by nebulization every 4 (four) hours as needed for wheezing. 25 vial 2     budesonide (PULMICORT) 0.25 mg/2 mL nebulizer solution Take 2 mL (0.25 mg total) by nebulization 2 (two) times a day. 100 mL 2     fluticasone propion-salmeterol (ADVAIR HFA) 115-21 mcg/actuation inhaler Inhale 2 puffs 2 (two) times a day. 1 Inhaler 12     omeprazole (PRILOSEC) 20 MG capsule Take 1 capsule (20 mg total) by mouth daily. 30 capsule 11     TRUEPLUS LANCETS 30 gauge Misc USE AS DIRECTED ONCE DAILY    1 HNUB SIV 1 ZAUG RAWS LI QHIA  0     ipratropium-albuterol (COMBIVENT RESPIMAT)  mcg/actuation Mist inhaler Inhale 1 puff every 4 hours as needed. 1 Inhaler 11     No current facility-administered medications for this visit.      Social History     Tobacco Use   Smoking Status Never Smoker   Smokeless Tobacco Never Used   Tobacco Comment    no passive exposure     OBJECTIVE: /70   Pulse 88   Temp 98.4  F (36.9  C) (Oral)    "Resp 14   Ht 4' 11.02\" (1.499 m)   Wt 133 lb 6.4 oz (60.5 kg)   SpO2 94%   BMI 26.93 kg/m     No results found for this or any previous visit (from the past 24 hour(s)).    PHYSICAL:  General Alert, awake, not in acute distress.   HEENT:             -Throat Oropharynx without edema, erythema.  Uvula midline, no deviation.             -Neck Neck FROM, no adenopathy.  Thyroid not visibly enlarged.   CV Normal S1 & S2. No murmurs.   RESP Non-labored, RRR, CTAB. No crackles. Mild wheezing of left lung lobe.    ABDOMEN Soft,non-tender. No rigidity, guarding.  Normal bowel sounds.        Karan Brandon PA-C         "

## 2021-06-02 NOTE — PROGRESS NOTES
Hospital discharge follow up call to pt, no answer.  Unable to leave a VM message as voicemail not set up, per automated recording.  RN will attempt call back at another time.    Krys Hill RN Care Manager, Population Health

## 2021-06-02 NOTE — TELEPHONE ENCOUNTER
Central PA team  [phone]  Pool: HE PA MED ([id number])          PA has been initiated.       PA form completed and faxed insurance via Cover My Meds     Key:  [id number]      Medication:  Omeprazole 20MG dr capsules      Insurance:  [employer]        Response will be received via fax and may take up to 5-10 business days depending on plan

## 2021-06-03 VITALS
DIASTOLIC BLOOD PRESSURE: 80 MMHG | HEART RATE: 98 BPM | RESPIRATION RATE: 14 BRPM | WEIGHT: 134 LBS | HEIGHT: 60 IN | SYSTOLIC BLOOD PRESSURE: 102 MMHG | BODY MASS INDEX: 26.31 KG/M2

## 2021-06-03 VITALS
SYSTOLIC BLOOD PRESSURE: 112 MMHG | DIASTOLIC BLOOD PRESSURE: 58 MMHG | TEMPERATURE: 97.7 F | HEART RATE: 94 BPM | HEIGHT: 60 IN | WEIGHT: 134 LBS | OXYGEN SATURATION: 91 % | BODY MASS INDEX: 26.31 KG/M2 | RESPIRATION RATE: 32 BRPM

## 2021-06-03 VITALS
DIASTOLIC BLOOD PRESSURE: 60 MMHG | RESPIRATION RATE: 18 BRPM | SYSTOLIC BLOOD PRESSURE: 104 MMHG | BODY MASS INDEX: 25.85 KG/M2 | WEIGHT: 128 LBS | OXYGEN SATURATION: 92 % | HEART RATE: 103 BPM

## 2021-06-03 VITALS
HEART RATE: 88 BPM | SYSTOLIC BLOOD PRESSURE: 100 MMHG | RESPIRATION RATE: 14 BRPM | TEMPERATURE: 98.4 F | BODY MASS INDEX: 26.19 KG/M2 | WEIGHT: 133.4 LBS | OXYGEN SATURATION: 94 % | DIASTOLIC BLOOD PRESSURE: 70 MMHG | HEIGHT: 60 IN

## 2021-06-03 VITALS
DIASTOLIC BLOOD PRESSURE: 76 MMHG | BODY MASS INDEX: 25.72 KG/M2 | HEIGHT: 60 IN | HEART RATE: 80 BPM | RESPIRATION RATE: 16 BRPM | WEIGHT: 131 LBS | SYSTOLIC BLOOD PRESSURE: 110 MMHG

## 2021-06-03 VITALS
SYSTOLIC BLOOD PRESSURE: 100 MMHG | BODY MASS INDEX: 26.46 KG/M2 | HEART RATE: 84 BPM | OXYGEN SATURATION: 92 % | DIASTOLIC BLOOD PRESSURE: 70 MMHG | WEIGHT: 131 LBS

## 2021-06-03 VITALS
HEART RATE: 91 BPM | TEMPERATURE: 97.8 F | BODY MASS INDEX: 25.79 KG/M2 | OXYGEN SATURATION: 96 % | WEIGHT: 131.38 LBS | DIASTOLIC BLOOD PRESSURE: 76 MMHG | SYSTOLIC BLOOD PRESSURE: 108 MMHG | HEIGHT: 60 IN

## 2021-06-03 VITALS — HEIGHT: 60 IN | WEIGHT: 134 LBS | BODY MASS INDEX: 26.31 KG/M2

## 2021-06-03 NOTE — PROGRESS NOTES
ASSESMENT AND PLAN:  1. Moderate asthma with exacerbation, unspecified whether persistent  -  Pt NAD, but SpO2 93%.  He refuses to go to ED.  -  Gave DuoNeb but was not  available and gave Albuterol Neb instead. O2 did not improve.  -  Appears Pt has not been using inhalers correctly.  He is not sure when inhalers he is using as there are many on his medlist and he does not know the names of them.  -  Asthma education given and went over differences of Controller and Rescue, and how to proper use inhalers.   -  Advised Pt to bring in all his meds/inhalers to every visit so we can go over it more accurately.  -  INCREASED Advair dose (see below).    -  Pt has Pulmonology visit on 11/25.   -  Discussed indications for emergent f/u.    Orders:   - fluticasone propion-salmeterol (ADVAIR) 250-50 mcg/dose DISKUS; Inhale 1 puff 2 (two) times a day.  Dispense: 2 each; Refill: 11   - albuterol nebulizer solution 2.5 mg (PROVENTIL)     Reviewed Medical/Social history and Medications. See new changes above.   Over 25 minutes total time spent with patient, with more than 50% in counseling and coordination of care on the above issues.   Discussed indications for emergent medical attention and routine F/u.  Patient/Parent/Guardian engaged in decision making process and verbalized understanding of the options discussed and agreed with the final treatment plan.     SUBJECTIVE:  Mary Arguello is a 48 y.o. male who presents  for evaluation of his Asthma.     Pt has been coughing and having shortness of breath.  He states the nebulizer works better but doesn't help when he is very short of breath.  When asked which inhaler or neb he was using, he was not able to say which one it was.  Pt has Albuterol inhaler and Flovent inhaler, Pulmicort neb and Albuterol neb on list, but was not able to to say exactly which ones he was using. He also has Appears he may be using his controller inhalers as rescue and did not understand between  controller and rescue inhalers. Education given again today. He is also not using correct techniques. Pt already has appt with Pulmonology on 11/25.  Asked him to bring all his inhalers and meds to all appts.      ROS:  Comprehensive Review of Systems Negative except stated in HPI.     Past Medical History:   Diagnosis Date     Fibrothorax     Scarring left lung     Hiatal hernia      Pneumonia      TB lung, latent 1985     Patient Active Problem List   Diagnosis     Anaphylaxis     Hiatal Hernia     Chest Pain     Nonspecific reaction to tuberculin skin test without active tuberculosis     Left Ventricular Hypertrophy     Diastolic Dysfunction     Primary Vitiligo     Fibrothorax     Acute asthma exacerbation     Acute respiratory failure with hypoxia (H)     Hyperglycemia     Tachycardia     Asthma exacerbation attacks     Community acquired pneumonia of right lower lobe of lung (H)     Dyspnea     Hypokalemia     Moderate asthma with exacerbation, unspecified whether persistent     Aspiration pneumonitis (H)     Current Outpatient Medications   Medication Sig Dispense Refill     albuterol (PROVENTIL) 2.5 mg /3 mL (0.083 %) nebulizer solution Take 3 mL (2.5 mg total) by nebulization every 4 (four) hours as needed for wheezing. 25 vial 2     budesonide (PULMICORT) 0.25 mg/2 mL nebulizer solution Take 2 mL (0.25 mg total) by nebulization 2 (two) times a day. 100 mL 2     ipratropium-albuterol (COMBIVENT RESPIMAT)  mcg/actuation Mist inhaler Inhale 1 puff every 4 hours as needed. 1 Inhaler 11     albuterol (PROAIR HFA;PROVENTIL HFA;VENTOLIN HFA) 90 mcg/actuation inhaler Inhale 2 puffs every 4 (four) hours as needed for wheezing. 1 Inhaler 0     albuterol (PROVENTIL) 5 mg/mL nebulizer solution Take 0.5 mL (2.5 mg total) by nebulization every 6 (six) hours as needed for wheezing. 20 mL 0     fluticasone propion-salmeterol (ADVAIR) 250-50 mcg/dose DISKUS Inhale 1 puff 2 (two) times a day. 2 each 11     omeprazole  "(PRILOSEC) 20 MG capsule Take 1 capsule (20 mg total) by mouth daily. (Patient not taking: Reported on 11/12/2019      ) 30 capsule 11     predniSONE (DELTASONE) 20 MG tablet Take 40 mg by mouth daily for 4 days. 8 tablet 0     Current Facility-Administered Medications   Medication Dose Route Frequency Provider Last Rate Last Dose     ipratropium-albuterol 0.5-2.5 mg/3 mL nebulizer solution 3 mL (DUO-NEB)  3 mL Nebulization Once Karan Brandon PA-C         Social History     Tobacco Use   Smoking Status Never Smoker   Smokeless Tobacco Never Used   Tobacco Comment    no passive exposure     OBJECTIVE: /58   Pulse 94   Temp 97.7  F (36.5  C) (Oral)   Resp (!) 32   Ht 4' 11\" (1.499 m)   Wt 134 lb (60.8 kg)   SpO2 91%   BMI 27.06 kg/m     Recent Results (from the past 24 hour(s))   HM2(CBC w/o Differential)    Collection Time: 11/12/19  6:00 PM   Result Value Ref Range    WBC 7.5 4.0 - 11.0 thou/uL    RBC 4.76 4.40 - 6.20 mill/uL    Hemoglobin 14.4 14.0 - 18.0 g/dL    Hematocrit 44.3 40.0 - 54.0 %    MCV 93 80 - 100 fL    MCH 30.3 27.0 - 34.0 pg    MCHC 32.5 32.0 - 36.0 g/dL    RDW 13.1 11.0 - 14.5 %    Platelets 187 140 - 440 thou/uL    MPV 9.0 8.5 - 12.5 fL   Basic Metabolic Panel    Collection Time: 11/12/19  6:00 PM   Result Value Ref Range    Sodium 142 136 - 145 mmol/L    Potassium 3.3 (L) 3.5 - 5.0 mmol/L    Chloride 106 98 - 107 mmol/L    CO2 27 22 - 31 mmol/L    Anion Gap, Calculation 9 5 - 18 mmol/L    Glucose 299 (H) 70 - 125 mg/dL    Calcium 9.2 8.5 - 10.5 mg/dL    BUN 9 8 - 22 mg/dL    Creatinine 0.89 0.70 - 1.30 mg/dL    GFR MDRD Af Amer >60 >60 mL/min/1.73m2    GFR MDRD Non Af Amer >60 >60 mL/min/1.73m2   ECG 12 lead nursing unit performed    Collection Time: 11/12/19  6:03 PM   Result Value Ref Range    SYSTOLIC BLOOD PRESSURE      DIASTOLIC BLOOD PRESSURE      VENTRICULAR RATE 109 BPM    ATRIAL RATE 109 BPM    P-R INTERVAL 132 ms    QRS DURATION 86 ms    Q-T INTERVAL 322 ms    QTC CALCULATION " (BEZET) 433 ms    P Axis 15 degrees    R AXIS 3 degrees    T AXIS 63 degrees    MUSE DIAGNOSIS       Sinus tachycardia  Inferior infarct (cited on or before 16-OCT-2019)  Abnormal ECG  When compared with ECG of 16-OCT-2019 23:25,  No significant change was found  Confirmed by JUAN HENDRICKSON MD LOC: ([id number]) on 11/13/2019 11:05:38 AM     Influenza A/B Rapid Test    Collection Time: 11/12/19  6:21 PM   Result Value Ref Range    Influenza  A, Rapid Antigen No Influenza A antigen detected No Influenza A antigen detected    Influenza B, Rapid Antigen No Influenza B antigen detected No Influenza B antigen detected     PHYSICAL:  General Alert, awake, not in acute distress.   CV Normal S1 & S2. No murmurs.   RESP Non-labored, RRR, CTAB. No crackles.  Wheezing present.        Karan Brandon PA-C

## 2021-06-03 NOTE — TELEPHONE ENCOUNTER
Please inform the patient that insurance will not pay for more than 120 capsules/year, so he will need to supplement that with over-the-counter omeprazole which he can buy-he will probably get the lowest prices if he buys it at a bulk supplier such as [employer] or [employer].

## 2021-06-03 NOTE — PROGRESS NOTES
Pt exercised on the treadmill for his stress test for 6 min on the Mike protocol.  Stage II was held to facilitate tracer uptake.  Pre exercise, pt's Sats 93-94%, faint expiratory wheezes lower lobes bilaterally.  Pt used 2 puffs albuterol prior to exercise.  At peak exercise, pt was audibly wheezing.   Sats 82 % at peak exercise.  Sats recovered to 95% within a couple of minutes.

## 2021-06-03 NOTE — PROGRESS NOTES
PULMONARY CLINIC FOLLOW UP NOTE    History:     HPI: Mary Arguello is a 48 y.o. male who is here for follow up of cough.  He was initially seen in clinic on May 2018.  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  When he was initially seen, PFTs were suggestive of reversible airway disease and asthma. Patient was started on albuterol inhaler.  Patient was hospitalized in December with asthma exacerbation.  He has since been started on steroid inhaler.    Interval History: Patient is here for his scheduled follow-up.  Since he was last seen, he was seen in the emergency department on 11/12/2019 for an asthma exacerbation.  He is currently using Wixela (fluiticasone propionate and salmeterol) but is not sure if he is using it right. He also has advair but is not using it. He has combivent as well that he uses twice a day. He a nebulizer that he uses as needed. He notes that he is a productive cough. He endorses wheezing. He is able a few blocks.  He endorses nocturnal symptoms as well almost daily.    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    ROS: 10 point review of system done. Pertinent findings are noted in the HPI.    Exam/Data:   /60   Pulse (!) 103   Resp 18   Wt 128 lb (58.1 kg)   SpO2 92% Comment: RA  BMI 25.85 kg/m  , Body mass index is 25.85 kg/m .  GEN: comfortable, NAD  HEENT: NCAT, EMOI, mmm  CVS: S1S2, RRR  Lung: bilateral exp wheezing.  Abd: soft, nt, + BS. No masses  Ext: no c/c/e  Neuro: nonfocal  Skin: no visible rash  Vasc: intact radial pulses bilaterally  Musculoskeletal: FROM all extremities  Psych: normal affect    Data:   Labs and Imaging personally reviewed.   Pertinent findings include:    Cta Chest Pe Run  Result Date: 12/29/2018  CTA CHEST PE RUN 12/28/2018 11:52 PM INDICATION: Chest pain, acute, pe suspected hypoxia, tachycardia, chest pain with exertion TECHNIQUE: Helical acquisition through the chest was performed during the arterial phase of contrast enhancement using IV contrast. 2D and 3D reconstructions were performed by the CT technologist. Dose reduction techniques were used. IV CONTRAST: Iohexol (Omni) 100 mL COMPARISON: CT from 5/7/2015. Chest radiograph from 12/28/2018. FINDINGS: ANGIOGRAM CHEST: Pulmonary arteries are normal caliber and negative for pulmonary emboli. Normal caliber thoracic aorta with no dissection or aneurysm. RV/LV RATIO: N/A LUNGS AND PLEURA: Redemonstrated calcified chronic left pleural effusion and fibrothorax with chronic left lower lobe basal segment rounded atelectasis. Increased atelectasis from comparison study. There is bronchial wall thickening throughout the right lung but most prevalent in the lower lung zone, with numerous centrilobular nodular infiltrates in the right middle and lower lobes. No visible pneumothorax. MEDIASTINUM: Heart size within normal limits. Moderate hiatal hernia. No pericardial effusion. LIMITED UPPER ABDOMEN: Right upper pole renal cyst. No acute findings. MUSCULOSKELETAL: No acute osseous findings.     CONCLUSION: 1.  Negative for pulmonary embolism or thoracic aortic aneurysm or dissection. 2.  Right-sided bronchial wall thickening, most prevalent in the lower lung, with centrilobular nodular infiltrates in the middle and lower lobes consistent with acute infectious or inflammatory etiology. 3.  Redemonstrated left-sided fibrothorax with increased adjacent round atelectasis. 4.  Moderate hiatal hernia.    PFTs on May, 2018:  Impression:  Full Pulmonary Function Test is abnormal.    PFTs are consistent with mild restrictive disease.  Spirometry is consistent with reversibility.  Diffusion capacity  when corrected for hemoglobin is moderately reduced    Swallow evaluation on 5/2018: normal.    Assessment/Plan:     Mary Arguello is a 48 y.o. male, lifelong non-smoker with history of chronic left-sided fibrothorax with rounded atelectasis, reversible airway disease likely secondary to asthma, hiatal hernia, and history of treatment for ? latent TB back in 1985 is here for follow-up.  He currently has an acute asthma exacerbation that is not well controlled.    Recommendations:  Continue using advair HFA. Will increase dose to 230-21 2 puffs twice a day. (he had been on Wixela, does not percieve benefit, instructed to stop it)  Stop using Wixela  Continue using combivent Respimat 2 puffs twice a day  Has a nebulzier  Will give a course of prednisone 40 mg x 5 days.  Given space to use with his advair HFA and provided teaching  Gargle/swish/spit after using advair  Flutter valve teaching provided  Continue albuterol inhaler as needed  Will get ige and differential next visit    FOLLOW UP: 4 weeks    Lloyd Case MD  Pulmonary and Critical Care Medicine  Electronically Signed on 11/25/2019    Current Outpatient Medications   Medication Sig Dispense Refill     albuterol (PROAIR HFA;PROVENTIL HFA;VENTOLIN HFA) 90 mcg/actuation inhaler Inhale 2 puffs every 4 (four) hours as needed for wheezing. 1 Inhaler 0     albuterol (PROVENTIL) 2.5 mg /3 mL (0.083 %) nebulizer solution Take 3 mL (2.5 mg total) by nebulization every 4 (four) hours as needed for wheezing. 25 vial 2     albuterol (PROVENTIL) 5 mg/mL nebulizer solution Take 0.5 mL (2.5 mg total) by nebulization every 6 (six) hours as needed for wheezing. 20 mL 0     budesonide (PULMICORT) 0.25 mg/2 mL nebulizer solution Take 2 mL (0.25 mg total) by nebulization 2 (two) times a day. 100 mL 2     fluticasone propion-salmeterol (ADVAIR) 250-50 mcg/dose DISKUS Inhale 1 puff 2 (two) times a day. 2 each 11     ipratropium-albuterol (COMBIVENT RESPIMAT)  mcg/actuation  Mist inhaler Inhale 1 puff every 4 hours as needed. 1 Inhaler 11     omeprazole (PRILOSEC) 20 MG capsule Take 1 capsule (20 mg total) by mouth daily. 30 capsule 11     fluticasone propion-salmeterol (ADVAIR HFA) 230-21 mcg/actuation inhaler Inhale 2 puffs 2 (two) times a day. 1 Inhaler 12     predniSONE (DELTASONE) 20 MG tablet Take 40 mg by mouth daily. 10 tablet 0     No current facility-administered medications for this visit.      No Known Allergies    Meds and Allergies: See EHR for the updated medication list and Allergies. These were reviewed.     Much or all of the text in this note was generated through the use of the Dragon Dictate voice-to-text software. Errors in spelling or words which seem out of context are unintentional. Sound alike errors, in particular, may have escaped editing.

## 2021-06-04 VITALS
OXYGEN SATURATION: 96 % | WEIGHT: 132 LBS | BODY MASS INDEX: 25.91 KG/M2 | HEIGHT: 60 IN | RESPIRATION RATE: 12 BRPM | DIASTOLIC BLOOD PRESSURE: 60 MMHG | SYSTOLIC BLOOD PRESSURE: 98 MMHG | HEART RATE: 64 BPM

## 2021-06-04 VITALS
WEIGHT: 130 LBS | RESPIRATION RATE: 20 BRPM | OXYGEN SATURATION: 97 % | SYSTOLIC BLOOD PRESSURE: 106 MMHG | HEIGHT: 60 IN | TEMPERATURE: 98 F | BODY MASS INDEX: 25.52 KG/M2 | HEART RATE: 62 BPM | DIASTOLIC BLOOD PRESSURE: 68 MMHG

## 2021-06-04 NOTE — TELEPHONE ENCOUNTER
2. Mild intermittent asthma with acute exacerbation     His nebulizer machine may not be functioning I have prescribed albuterol and Pulmicort nebs for him.  He says he has no shortness of breath well being seated he would like to return to work I filled out a work form for him.  He understands that if he takes his inhalers and nebulizing medication he may have some side effects.  - albuterol (PROVENTIL) 2.5 mg /3 mL (0.083 %) nebulizer solution; Take 3 mL (2.5 mg total) by nebulization every 4 (four) hours as needed for wheezing.  Dispense: 25 vial; Refill: 2

## 2021-06-04 NOTE — PROGRESS NOTES
PULMONARY CLINIC FOLLOW UP NOTE    History:     HPI: Mary Arguello is a 48 y.o. male who is here for follow up of cough.  He was initially seen in clinic on May 2018.  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  When he was initially seen, PFTs were suggestive of reversible airway disease and asthma. Patient was started on albuterol inhaler.  Patient was hospitalized in December 2018 with asthma exacerbation.     Interval History: pt is here for his follow up of asthma. He is now on Advair -21 two puffs tiwce a day, Combivent Respimat 2 puffs twice a day.  He has nebulizer that he uses as needed.  He has a spacer that he is using with his Advair HFA.  He does not gargle. He also has an albuterol inhaler as needed. He notes that his symptoms are better controlled. No hospitalizations since he was last seen. He endorses sinusitis symptoms.    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    ROS: 10 point review of system done. Pertinent findings are noted in the HPI.    Exam/Data:   /70   Pulse 84   Wt 131 lb (59.4 kg)   SpO2 92%   BMI 26.46 kg/m  , Body mass index is 26.46 kg/m .  GEN: comfortable, NAD  HEENT: NCAT, EMOI, mmm  CVS: S1S2, RRR  Lung: bilateral exp wheezing.  Abd: soft, nt, + BS. No masses  Ext: no c/c/e  Neuro: nonfocal  Skin: no visible rash  Vasc: intact radial pulses bilaterally  Musculoskeletal: FROM all extremities  Psych: normal affect    Data:   Labs and Imaging personally reviewed.  Pertinent findings include:    Cta Chest Pe Run  Result Date: 12/29/2018  CTA CHEST PE RUN 12/28/2018 11:52 PM INDICATION: Chest pain, acute, pe suspected hypoxia, tachycardia, chest  pain with exertion TECHNIQUE: Helical acquisition through the chest was performed during the arterial phase of contrast enhancement using IV contrast. 2D and 3D reconstructions were performed by the CT technologist. Dose reduction techniques were used. IV CONTRAST: Iohexol (Omni) 100 mL COMPARISON: CT from 5/7/2015. Chest radiograph from 12/28/2018. FINDINGS: ANGIOGRAM CHEST: Pulmonary arteries are normal caliber and negative for pulmonary emboli. Normal caliber thoracic aorta with no dissection or aneurysm. RV/LV RATIO: N/A LUNGS AND PLEURA: Redemonstrated calcified chronic left pleural effusion and fibrothorax with chronic left lower lobe basal segment rounded atelectasis. Increased atelectasis from comparison study. There is bronchial wall thickening throughout the right lung but most prevalent in the lower lung zone, with numerous centrilobular nodular infiltrates in the right middle and lower lobes. No visible pneumothorax. MEDIASTINUM: Heart size within normal limits. Moderate hiatal hernia. No pericardial effusion. LIMITED UPPER ABDOMEN: Right upper pole renal cyst. No acute findings. MUSCULOSKELETAL: No acute osseous findings.     CONCLUSION: 1.  Negative for pulmonary embolism or thoracic aortic aneurysm or dissection. 2.  Right-sided bronchial wall thickening, most prevalent in the lower lung, with centrilobular nodular infiltrates in the middle and lower lobes consistent with acute infectious or inflammatory etiology. 3.  Redemonstrated left-sided fibrothorax with increased adjacent round atelectasis. 4.  Moderate hiatal hernia.    PFTs on May, 2018:  Impression:  Full Pulmonary Function Test is abnormal.    PFTs are consistent with mild restrictive disease.  Spirometry is consistent with reversibility.  Diffusion capacity when corrected for hemoglobin is moderately reduced    Swallow evaluation on 5/2018: normal.    Assessment/Plan:     Mary Arguello is a 48 y.o. male, lifelong non-smoker with history of  chronic left-sided fibrothorax with rounded atelectasis, reversible airway disease likely secondary to asthma, hiatal hernia, and history of treatment for ? latent TB back in 1985 is here for follow-up.  He currently has an acute asthma exacerbation that is not well controlled.    Recommendations:  Continue using advair HFA. Increased dose to 230-21 2 puffs twice a day. (he had been on Wixela, does not percieve benefit, instructed to stop it). He stopped using Wixela.  Continue using combivent Respimat 2 puffs twice a day  Has a nebulzier  Has a spacer to use with his advair HFA and provided teaching previously.  Gargle/swish/spit after using advair  Flutter valve teaching provided previeusly. Encouraged to continue using.  Continue albuterol inhaler as needed  Star on singulair for sinusitis  On PPI for GERD - not using much  Will get ige and differential  Niox (12/27/19) was elevated at 44.  Bring medications next visit    FOLLOW UP: 6 weeks    Lloyd Case MD  Pulmonary and Critical Care Medicine  Electronically Signed on 12/27/2019    Current Outpatient Medications   Medication Sig Dispense Refill     albuterol (PROAIR HFA;PROVENTIL HFA;VENTOLIN HFA) 90 mcg/actuation inhaler Inhale 2 puffs every 4 (four) hours as needed for wheezing. 1 Inhaler 0     albuterol (PROVENTIL) 2.5 mg /3 mL (0.083 %) nebulizer solution NEBULIZE 1 VIAL EVERY 4 HOURS AS NEEDED FOR WHEEZING. 90 mL 2     albuterol (PROVENTIL) 5 mg/mL nebulizer solution Take 0.5 mL (2.5 mg total) by nebulization every 6 (six) hours as needed for wheezing. 20 mL 0     budesonide (PULMICORT) 0.25 mg/2 mL nebulizer solution Take 2 mL (0.25 mg total) by nebulization 2 (two) times a day. 100 mL 2     fluticasone propion-salmeterol (ADVAIR HFA) 230-21 mcg/actuation inhaler Inhale 2 puffs 2 (two) times a day. 1 Inhaler 12     fluticasone propion-salmeterol (ADVAIR) 250-50 mcg/dose DISKUS Inhale 1 puff 2 (two) times a day. 2 each 11     ipratropium-albuterol  (COMBIVENT RESPIMAT)  mcg/actuation Mist inhaler Inhale 1 puff every 4 hours as needed. 1 Inhaler 11     omeprazole (PRILOSEC) 20 MG capsule Take 1 capsule (20 mg total) by mouth daily. 30 capsule 11     predniSONE (DELTASONE) 20 MG tablet Take 40 mg by mouth daily. 10 tablet 0     No current facility-administered medications for this visit.      No Known Allergies    Meds and Allergies: See EHR for the updated medication list and Allergies. These were reviewed.     Much or all of the text in this note was generated through the use of the Dragon Dictate voice-to-text software. Errors in spelling or words which seem out of context are unintentional. Sound alike errors, in particular, may have escaped editing.

## 2021-06-05 ENCOUNTER — RECORDS - HEALTHEAST (OUTPATIENT)
Dept: FAMILY MEDICINE | Facility: CLINIC | Age: 51
End: 2021-06-05

## 2021-06-05 VITALS
HEIGHT: 60 IN | WEIGHT: 135 LBS | SYSTOLIC BLOOD PRESSURE: 108 MMHG | BODY MASS INDEX: 26.5 KG/M2 | HEART RATE: 70 BPM | DIASTOLIC BLOOD PRESSURE: 70 MMHG | RESPIRATION RATE: 14 BRPM

## 2021-06-05 DIAGNOSIS — R07.9 CHEST PAIN: ICD-10-CM

## 2021-06-05 NOTE — TELEPHONE ENCOUNTER
I left a message for pt to ask if he received: albuterol (PROVENTIL) 2.5 mg /3 mL (0.083 %) nebulizer solution. If he did, he can disregard the call and if he didn't, then he should give us a call back.

## 2021-06-05 NOTE — PROGRESS NOTES
ASSESMENT AND PLAN:  Diagnoses and all orders for this visit:    Intermittent asthma without complication, unspecified asthma severity  Reviewed the notes and results from his ER visit back in November and follow-up with pulmonary clinic in December and follow-up with cardiology clinic in December.  No concern for cardiac etiology of his symptoms.  It appears to be purely pulmonary.  I did  the patient on the importance of continuing with his Advair controller at least until he follows up with pulmonary medicine to decide whether this should be continued indefinitely.  I counseled the patient on the pathophysiology of scar tissue formation and progression of disease and how this could be disrupted by chronic controller use.  He is in agreement with the plan and he will continue to use albuterol as needed.      Reviewed the risks and benefits of the treatment plan with the patient and/or caregiver and we discussed indications for routine and emergent follow-up.        SUBJECTIVE: 49-year-old male in for follow-up, he had been in the ER with shortness of breath back in November, had follow-up with cardiology and pulmonology.  His cardiac work-up was negative.  Subsequently, his shortness of breath has resolved completely.  He is feeling back to normal.  In fact, he stopped taking his Advair.  I encouraged him to restart as detailed above.  He has not had to use albuterol recently.  He feels like he can do everything he needs to do in his daily life without getting short of breath.    Past Medical History:   Diagnosis Date     Fibrothorax     Scarring left lung     Hiatal hernia      Pneumonia      TB lung, latent 1985     Patient Active Problem List   Diagnosis     Anaphylaxis     Hiatal Hernia     Chest Pain     Nonspecific reaction to tuberculin skin test without active tuberculosis     Left Ventricular Hypertrophy     Diastolic Dysfunction     Primary Vitiligo     Fibrothorax     Acute asthma exacerbation  "    Acute respiratory failure with hypoxia (H)     Hyperglycemia     Tachycardia     Asthma exacerbation attacks     Community acquired pneumonia of right lower lobe of lung (H)     Dyspnea     Hypokalemia     Moderate asthma with exacerbation, unspecified whether persistent     Aspiration pneumonitis (H)     Intermittent asthma without complication, unspecified asthma severity     Current Outpatient Medications   Medication Sig Dispense Refill     albuterol (PROAIR HFA;PROVENTIL HFA;VENTOLIN HFA) 90 mcg/actuation inhaler Inhale 2 puffs every 4 (four) hours as needed for wheezing. 1 Inhaler 0     albuterol (PROVENTIL) 2.5 mg /3 mL (0.083 %) nebulizer solution NEBULIZE 1 VIAL EVERY 4 HOURS AS NEEDED FOR WHEEZING. 90 mL 2     albuterol (PROVENTIL) 5 mg/mL nebulizer solution Take 0.5 mL (2.5 mg total) by nebulization every 6 (six) hours as needed for wheezing. 20 mL 0     fluticasone propion-salmeterol (ADVAIR HFA) 230-21 mcg/actuation inhaler Inhale 2 puffs 2 (two) times a day. 1 Inhaler 12     montelukast (SINGULAIR) 10 mg tablet Take 1 tablet (10 mg total) by mouth at bedtime. 60 tablet 3     omeprazole (PRILOSEC) 20 MG capsule Take 1 capsule (20 mg total) by mouth daily. 30 capsule 11     No current facility-administered medications for this visit.      Social History     Tobacco Use   Smoking Status Never Smoker   Smokeless Tobacco Never Used   Tobacco Comment    no passive exposure       OBJECTICE: /68 (Patient Site: Left Arm)   Pulse 62   Temp 98  F (36.7  C) (Oral)   Resp 20   Ht 4' 11\" (1.499 m)   Wt 130 lb (59 kg)   SpO2 97%   BMI 26.26 kg/m       No results found for this or any previous visit (from the past 24 hour(s)).     GEN-alert, appropriate, in no apparent distress   CV-regular rate and rhythm   RESP-some blunting of the breath sounds in the left base, chronic, no wheezing or crackles.   EXTREM-warm with no edema no calf tenderness.     Miguel Angel Goodson          "

## 2021-06-05 NOTE — TELEPHONE ENCOUNTER
Pt states that he is feeling better since he was discharged from the hospital. He was able to  his nebulizer solution.

## 2021-06-06 NOTE — PROGRESS NOTES
"PULMONARY CLINIC FOLLOW UP NOTE    History:     HPI: Mary Arguello is a 49 y.o. male who is here for follow up of cough.  He was initially seen in clinic on May 2018.  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  When he was initially seen, PFTs were suggestive of reversible airway disease and asthma. Patient was started on albuterol inhaler.  Patient was hospitalized in December 2018 with asthma exacerbation.     Interval History: pt is here for his follow up of asthma. He denies any hospitalizations, abx, steroids since he was last seen.  He is now on Advair -21 two puffs tiwce a day, Combivent Respimat 2 puffs twice a day.  He has nebulizer that he uses as needed.  He has a spacer that he is using with his Advair HFA.  He does not gargle even though he has been instructed to do so. He also has an albuterol inhaler as needed. He was started on singulair on his last visit. His sinusitis improved.     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    ROS: 10 point review of system done. Pertinent findings are noted in the HPI.    Exam/Data:   BP 98/60   Pulse 64   Resp 12   Ht 4' 11\" (1.499 m)   Wt 132 lb (59.9 kg)   SpO2 96%   BMI 26.66 kg/m  , Body mass index is 26.66 kg/m .  GEN: comfortable, NAD  HEENT: NCAT, EMOI, mmm  CVS: S1S2, RRR  Lung: bilateral exp wheezing.  Abd: soft, nt, + BS. No masses  Ext: no c/c/e  Neuro: nonfocal  Skin: no visible rash  Vasc: intact radial pulses bilaterally  Musculoskeletal: FROM all extremities  Psych: normal affect    Data:   Labs and Imaging personally reviewed.  Pertinent findings include:    Cta Chest Pe Run  Result Date: 12/29/2018  CTA CHEST PE RUN " 12/28/2018 11:52 PM INDICATION: Chest pain, acute, pe suspected hypoxia, tachycardia, chest pain with exertion TECHNIQUE: Helical acquisition through the chest was performed during the arterial phase of contrast enhancement using IV contrast. 2D and 3D reconstructions were performed by the CT technologist. Dose reduction techniques were used. IV CONTRAST: Iohexol (Omni) 100 mL COMPARISON: CT from 5/7/2015. Chest radiograph from 12/28/2018. FINDINGS: ANGIOGRAM CHEST: Pulmonary arteries are normal caliber and negative for pulmonary emboli. Normal caliber thoracic aorta with no dissection or aneurysm. RV/LV RATIO: N/A LUNGS AND PLEURA: Redemonstrated calcified chronic left pleural effusion and fibrothorax with chronic left lower lobe basal segment rounded atelectasis. Increased atelectasis from comparison study. There is bronchial wall thickening throughout the right lung but most prevalent in the lower lung zone, with numerous centrilobular nodular infiltrates in the right middle and lower lobes. No visible pneumothorax. MEDIASTINUM: Heart size within normal limits. Moderate hiatal hernia. No pericardial effusion. LIMITED UPPER ABDOMEN: Right upper pole renal cyst. No acute findings. MUSCULOSKELETAL: No acute osseous findings.     CONCLUSION: 1.  Negative for pulmonary embolism or thoracic aortic aneurysm or dissection. 2.  Right-sided bronchial wall thickening, most prevalent in the lower lung, with centrilobular nodular infiltrates in the middle and lower lobes consistent with acute infectious or inflammatory etiology. 3.  Redemonstrated left-sided fibrothorax with increased adjacent round atelectasis. 4.  Moderate hiatal hernia.    PFTs on May, 2018:  Impression:  Full Pulmonary Function Test is abnormal.    PFTs are consistent with mild restrictive disease.  Spirometry is consistent with reversibility.  Diffusion capacity when corrected for hemoglobin is moderately reduced    Swallow evaluation on 5/2018:  normal.    Assessment/Plan:     Mary Arguello is a 49 y.o. male, lifelong non-smoker with history of chronic left-sided fibrothorax with rounded atelectasis, reversible airway disease likely secondary to asthma, hiatal hernia, and history of treatment for ? latent TB back in 1985 is here for follow-up.  Eosinophil count elevated and IGE markedly elevated.    Recommendations:  Continue using advair HFA. Increased dose to 230-21 2 puffs twice a day. (he had been on Wixela, does not percieve benefit, instructed to stop it). He stopped using Wixela.  Continue using combivent Respimat 2 puffs twice a day  Has a nebulzier  Has a spacer to use with his advair HFA and provided teaching previously.  Gargle/swish/spit after using advair  Flutter valve teaching provided previeusly. Encouraged to continue using.  Continue albuterol inhaler as needed  Continue singulair for sinusitis (started on 12/2019)  On PPI for GERD - not using much  Will get ige and differential  Niox (12/27/19) was elevated at 44.  Consider Xolair if symptoms are uncontrolled.    FOLLOW UP: 3 months.    Lloyd Case MD  Pulmonary and Critical Care Medicine  Electronically Signed on 2/11/2020    Current Outpatient Medications   Medication Sig Dispense Refill     albuterol (PROAIR HFA;PROVENTIL HFA;VENTOLIN HFA) 90 mcg/actuation inhaler Inhale 2 puffs every 4 (four) hours as needed for wheezing. 1 Inhaler 0     albuterol (PROVENTIL) 2.5 mg /3 mL (0.083 %) nebulizer solution NEBULIZE 1 VIAL EVERY 4 HOURS AS NEEDED FOR WHEEZING. 90 mL 2     albuterol (PROVENTIL) 5 mg/mL nebulizer solution Take 0.5 mL (2.5 mg total) by nebulization every 6 (six) hours as needed for wheezing. 20 mL 0     fluticasone propion-salmeterol (ADVAIR HFA) 230-21 mcg/actuation inhaler Inhale 2 puffs 2 (two) times a day. 1 Inhaler 12     montelukast (SINGULAIR) 10 mg tablet Take 1 tablet (10 mg total) by mouth at bedtime. 60 tablet 3     omeprazole (PRILOSEC) 20 MG capsule Take 1  capsule (20 mg total) by mouth daily. 30 capsule 11     No current facility-administered medications for this visit.      No Known Allergies    Meds and Allergies: See EHR for the updated medication list and Allergies. These were reviewed.     Much or all of the text in this note was generated through the use of the Dragon Dictate voice-to-text software. Errors in spelling or words which seem out of context are unintentional. Sound alike errors, in particular, may have escaped editing.

## 2021-06-08 NOTE — TELEPHONE ENCOUNTER
RN cannot approve Refill Request    RN can NOT refill this medication med is not covered by policy/route to provider. Last office visit: 10/25/2019 Arie Archibald MD Last Physical: Visit date not found Last MTM visit: Visit date not found Last visit same specialty: 2/4/2020 Miguel Angel Goodson MD.  Next visit within 3 mo: Visit date not found  Next physical within 3 mo: Visit date not found      Karrie Dunn, [facility] Triage/Med Refill 5/19/2020    Requested Prescriptions   Pending Prescriptions Disp Refills     budesonide (PULMICORT) 0.25 mg/2 mL nebulizer solution [Pharmacy Med Name: BUDESONIDE 0.25 MG/2ML SUSP 0.25 AVINASH] 100 mL 2     Sig: NEBULIZE 2 ML 2 TIMES A DAY.       There is no refill protocol information for this order

## 2021-06-08 NOTE — TELEPHONE ENCOUNTER
Refill Approved    Rx renewed per Medication Renewal Policy. Medication was last renewed on 12/10/19.    Zoraida Marin, [facility] Triage/Med Refill 5/20/2020     Requested Prescriptions   Pending Prescriptions Disp Refills     albuterol (PROVENTIL) 2.5 mg /3 mL (0.083 %) nebulizer solution [Pharmacy Med Name: ALBUTEROL SUL 2.5 MG/3 ML S (2.5 MG/3ML NEBU] 90 mL 2     Sig: NEBULIZE 1 VIAL EVERY 4 HOURS AS NEEDED FOR WHEEZING.       Albuterol/Levalbuterol Refill Protocol Passed - 5/18/2020  2:03 PM        Passed - PCP or prescribing provider visit in last year     Last office visit with prescriber/PCP: 2/4/2020 Miguel Angel Goodson MD OR same dept: 2/4/2020 Miguel Angel Goodson MD OR same specialty: 2/4/2020 Miguel Angel Goodson MD Last physical: Visit date not found       Next appt within 3 mo: Visit date not found  Next physical within 3 mo: Visit date not found  Prescriber OR PCP: Miguel Angel Goodson MD  Last diagnosis associated with med order: 1. Mild intermittent asthma with acute exacerbation  - albuterol (PROVENTIL) 2.5 mg /3 mL (0.083 %) nebulizer solution [Pharmacy Med Name: ALBUTEROL SUL 2.5 MG/3 ML S (2.5 MG/3ML NEBU]; NEBULIZE 1 VIAL EVERY 4 HOURS AS NEEDED FOR WHEEZING.  Dispense: 90 mL; Refill: 2    If protocol passes may refill for 6 months if within 3 months of last provider visit (or a total of 9 months). If patient requesting >1 inhaler per month refill x 6 months and have patient make appointment with provider.

## 2021-06-12 NOTE — PROGRESS NOTES
Subjective:   Mary comes in today with a one-week history of increasing wheezing.  At nighttime he hears himself breathing.  He has had a sore throat associated with this.  He denies headaches.  His appetite is been good.  He denies any fevers, sweats or chills.  He has had no upset stomach.  There is been no rash of any kind.  He has had no significant head congestion or ear pain.  He denies dizziness.  Nobody else in the household has been ill.  He has not been exposed to any fumes or dusts of any type.      Objective:  HEENT: Both conjunctivae are clear.  TMs are gray.  The pharynx reveals deep erythema but no exudate.  Uvula is midline.  Sinuses are nontender.  Neck: Mild lymphadenopathy noted in the submandibular areas.  These are slightly tender to palpation.  No posterior nodes present.  Lungs: Lungs today do reveal a few expiratory wheezes.  These are very light.  No obvious respiratory distress noted.  No rales are present.  Cardiac: No murmur heard.  Abdomen: Abdomen is soft and nontender.  No rash noted on the abdomen.      Assessment:  1.  Reactive airway disease secondary to viral illness  2.  Pharyngitis consistent with strep pharyngitis  3.  Anterior cervical lymphadenopathy secondary to #2      Plan:  The patient will start Pen-Vee K 500 mg's twice daily.  Strep test was positive today.  He will try to take extra liquids.  He will monitor his breathing closely.  If wheezing persists he may benefit from starting an inhaler.  I discussed this with him.  We will see how the penicillin works first.  I believe if we get rid of the strep infection his lung symptoms will improve.

## 2021-06-12 NOTE — TELEPHONE ENCOUNTER
Refill Approved    Rx renewed per Medication Renewal Policy. Medication was last renewed on 5/20/20, last OV 2/4/20.    Ekta Tidwell, [facility] Triage/Med Refill 10/25/2020     Requested Prescriptions   Pending Prescriptions Disp Refills     albuterol (PROVENTIL) 2.5 mg /3 mL (0.083 %) nebulizer solution [Pharmacy Med Name: ALBUTEROL SUL 2.5 MG/3 ML S (2.5 MG/3ML Nebulization Solution] 90 mL 2     Sig: NEBULIZE 1 VIAL EVERY 4 HOURS AS NEEDED FOR WHEEZING.       Albuterol/Levalbuterol Refill Protocol Passed - 10/23/2020  9:45 AM        Passed - PCP or prescribing provider visit in last year     Last office visit with prescriber/PCP: 2/4/2020 Miguel Angel Goodson MD OR same dept: 2/4/2020 Miguel Angel Goodson MD OR same specialty: 2/4/2020 Miguel Angel Goodson MD Last physical: Visit date not found       Next appt within 3 mo: Visit date not found  Next physical within 3 mo: Visit date not found  Prescriber OR PCP: Miguel Angel Goodson MD  Last diagnosis associated with med order: 1. Mild intermittent asthma with acute exacerbation  - albuterol (PROVENTIL) 2.5 mg /3 mL (0.083 %) nebulizer solution [Pharmacy Med Name: ALBUTEROL SUL 2.5 MG/3 ML S (2.5 MG/3ML Nebulization Solution]; NEBULIZE 1 VIAL EVERY 4 HOURS AS NEEDED FOR WHEEZING.  Dispense: 90 mL; Refill: 2    If protocol passes may refill for 6 months if within 3 months of last provider visit (or a total of 9 months). If patient requesting >1 inhaler per month refill x 6 months and have patient make appointment with provider.

## 2021-06-15 NOTE — TELEPHONE ENCOUNTER
RN cannot approve Refill Request    RN can NOT refill this medication PCP messaged that patient is overdue for Office Visit. Last office visit: 10/25/2019 Arie Archibald MD Last Physical: Visit date not found Last MTM visit: Visit date not found Last visit same specialty: 2/4/2020 Miguel Angel Goodson MD.  Next visit within 3 mo: Visit date not found  Next physical within 3 mo: Visit date not found      Ekta Tidwell, [facility] Triage/Med Refill 2/13/2021    Requested Prescriptions   Pending Prescriptions Disp Refills     omeprazole (PRILOSEC) 20 MG capsule [Pharmacy Med Name: OMEPRAZOLE 20 MG CPDR 20 Capsule] 30 capsule 11     Sig: TAKE 1 CAPSULE 30 MINUTES BEFORE FIRST MEAL DAILY FOR STOMACH // 1 HNUB NOJ 1 LUB UA NTEJ NOJ TSHAIS PAB ALE MOB PLAB/MOB NCAUJ PLAB       GI Medications Refill Protocol Failed - 2/12/2021  2:34 PM        Failed - PCP or prescribing provider visit in last 12 or next 3 months.     Last office visit with prescriber/PCP: 10/25/2019 Arie Archibald MD OR same dept: Visit date not found OR same specialty: 2/4/2020 Miguel Angel Goodson MD  Last physical: Visit date not found Last MTM visit: Visit date not found   Next visit within 3 mo: Visit date not found  Next physical within 3 mo: Visit date not found  Prescriber OR PCP: Arie Archibald MD  Last diagnosis associated with med order: 1. Gastroesophageal reflux disease with esophagitis  - omeprazole (PRILOSEC) 20 MG capsule [Pharmacy Med Name: OMEPRAZOLE 20 MG CPDR 20 Capsule]; TAKE 1 CAPSULE 30 MINUTES BEFORE FIRST MEAL DAILY FOR STOMACH // 1 HNUB NOJ 1 LUB UA NTEJ NOJ TSHAIS PAB ALE MOB PLAB/MOB NCAUJ PLAB  Dispense: 30 capsule; Refill: 11    If protocol passes may refill for 12 months if within 3 months of last provider visit (or a total of 15 months).

## 2021-06-16 PROBLEM — J45.20 INTERMITTENT ASTHMA WITHOUT COMPLICATION, UNSPECIFIED ASTHMA SEVERITY: Status: ACTIVE | Noted: 2020-02-04

## 2021-06-16 PROBLEM — R06.00 DYSPNEA: Status: ACTIVE | Noted: 2019-10-17

## 2021-06-16 PROBLEM — J45.901 ASTHMA EXACERBATION ATTACKS: Status: ACTIVE | Noted: 2018-12-28

## 2021-06-16 PROBLEM — J45.901 ACUTE ASTHMA EXACERBATION: Status: ACTIVE | Noted: 2018-11-28

## 2021-06-16 NOTE — TELEPHONE ENCOUNTER
Telephone Encounter by Dominique Miller at 3/19/2019 10:17 AM     Author: Dominique Miller Service: -- Author Type: --    Filed: 3/19/2019 10:18 AM Encounter Date: 3/17/2019 Status: Signed    : Dominique Miller       PRIOR AUTHORIZATION DENIED    Denial Rational: Patient must try and fail symbicort-this will also require a PA but will be covered as long as generic airduo is on backorder            Appeal Information: If provider would like to appeal please provide a letter of medical necessity stating why formulary alternatives would not be clinically appropriate for the patient and route back to the PA team.

## 2021-06-16 NOTE — TELEPHONE ENCOUNTER
Telephone Encounter by Vicky Nunez at 11/4/2019  4:04 PM     Author: Vicky Nunez Service: -- Author Type: --    Filed: 11/4/2019  4:05 PM Encounter Date: 10/29/2019 Status: Signed    : Vicky Nunez       PRIOR AUTHORIZATION DENIED    Denial Rational: per call to [employer] Patient is able to get up to 1 capsule per day for a maximum of 120 days within 365 days.  This is the duration set by patient's plan for PPIs.  This limit is shared across all PPIs.      Will scan in denial letter once received.       Appeal Information: This medication was denied. If physician would like to appeal because patient has contraindication or allergy to covered medication please write letter of medical necessity and route back to PA team to initiate.  If no further action is needed please close encounter thank you.

## 2021-06-16 NOTE — TELEPHONE ENCOUNTER
Telephone Encounter by Dominique Miller at 3/22/2019  4:09 PM     Author: Dominique Millre Service: -- Author Type: --    Filed: 3/22/2019  4:10 PM Encounter Date: 3/22/2019 Status: Signed    : Dominique Miller APPROVED:    Approval start date:03/21/2019  Approval end date:03/21/2021    Pharmacy has been notified of approval and will contact patient when medication is ready for pickup.

## 2021-06-16 NOTE — TELEPHONE ENCOUNTER
Telephone Encounter by Vicky Nunez at 1/15/2019  3:31 PM     Author: Vicky Nunez Service: -- Author Type: --    Filed: 1/15/2019  3:32 PM Encounter Date: 1/11/2019 Status: Addendum    : Vicky Nunez    Related Notes: Original Note by Vicky Nunez filed at 1/15/2019  3:32 PM       Central PA team  [phone]    PA has been initiated.

## 2021-06-16 NOTE — TELEPHONE ENCOUNTER
Telephone Encounter by Vicky Nunez at 1/16/2019 12:29 PM     Author: Vicky Nunez Service: -- Author Type: --    Filed: 1/16/2019 12:29 PM Encounter Date: 1/11/2019 Status: Signed    : Vicky Nunez APPROVED:    Approval start date: 1/16/19  Approval end date: 2/16/19    Pharmacy has been notified of approval and will contact patient when medication is ready for pickup.

## 2021-06-16 NOTE — PROGRESS NOTES
ASSESMENT AND PLAN:  Diagnoses and all orders for this visit:    History of chronic fibrothorax with new shortness of breath and bronchospasm over the last several months  Unclear etiology.  Discussed options today with the patient, he will use albuterol as prescribed below for his symptoms from now until the time that he can have a evaluation at pulmonary clinic as ordered below.  We discussed indications for sooner follow-up in more urgent follow-up.  -     Ambulatory referral to Pulmonology    -     albuterol (VENTOLIN HFA) 90 mcg/actuation inhaler; INHALE 2 PUFFS BY MOUTH EVERY 6 HOURS AS NEEDED FOR WHEEZING./ IB HNUB NQUS 2 PAS TXHUA 6 TEEV YOG HAWB POB  Dispense: 18 g; Refill: 4          SUBJECTIVE: 47-year-old male who has a past history of a known chronic fibrothorax and scarring of the left lung, previously imaging had included interval x-rays and a CT scan.  Please see those reports for details.  About 3 months ago he started noticing some shortness of breath and tightness in his breathing and wheezing that would occur intermittently.  He did use albuterol and had a good response with improvement in his symptoms.  The patient has not had any hemoptysis or recurrent fever or chronic cough or chest pain or night sweats.      Past Medical History:   Diagnosis Date     Hiatal hernia      Patient Active Problem List   Diagnosis     Anaphylaxis     Hiatal Hernia     Chest Pain     Latent Tuberculosis     Left Ventricular Hypertrophy     Diastolic Dysfunction     Primary Vitiligo     Fibrothorax     Current Outpatient Prescriptions   Medication Sig Dispense Refill     albuterol (VENTOLIN HFA) 90 mcg/actuation inhaler INHALE 2 PUFFS BY MOUTH EVERY 6 HOURS AS NEEDED FOR WHEEZING./ IB HNUB NQUS 2 PAS TXHUA 6 TEEV YOG HAWB POB 18 g 4     chlorhexidine (PERIDEX) 0.12 % solution        No current facility-administered medications for this visit.      History   Smoking Status     Never Smoker   Smokeless Tobacco      "Never Used       OBJECTICE: /84 (Patient Site: Left Arm, Patient Position: Sitting, Cuff Size: Adult Regular)  Pulse 73  Temp 98  F (36.7  C) (Oral)   Resp 18  Ht 4' 10\" (1.473 m)  Wt 133 lb 8 oz (60.6 kg)  SpO2 97%  BMI 27.9 kg/m2     No results found for this or any previous visit (from the past 24 hour(s)).     GEN-alert, appropriate, in no apparent distress   HEENT-neck is supple with no palpable mass or lymphadenopathy, oropharynx is clear.   CV-regular rate and rhythm with no murmur   RESP-mild bilateral wheezing, lungs otherwise clear to auscultation, good air movement.   EXTREM-warm with no ankle edema.       Miguel Angel Goodson          "

## 2021-06-17 NOTE — TELEPHONE ENCOUNTER
RN cannot approve Refill Request    RN can NOT refill this medication PCP messaged that patient is overdue for Office Visit. Last office visit: 2/4/2020 Miguel Angel Goodson MD Last Physical: Visit date not found Last MTM visit: Visit date not found Last visit same specialty: 2/4/2020 Miguel Angel Goodson MD.  Next visit within 3 mo: Visit date not found  Next physical within 3 mo: Visit date not found      Ekta Tidwell, [facility] Triage/Med Refill 5/22/2021    Requested Prescriptions   Pending Prescriptions Disp Refills     albuterol (PROAIR HFA;PROVENTIL HFA;VENTOLIN HFA) 90 mcg/actuation inhaler [Pharmacy Med Name: ALBUTEROL SULFATE  ( 108 (90 BAS Aerosol] 18 g 6     Sig: INHALE 2 PUFFS EVERY 4 HOURS AS NEEDED FOR WHEEZING OR SHORTNESS OF BREATH.//IB ZAUG NQUS 2 PAS, 4 TEEV SIV IB ZAUG YOG HAWB POB.       Albuterol/Levalbuterol Refill Protocol Failed - 5/21/2021  9:57 AM        Failed - PCP or prescribing provider visit in last year     Last office visit with prescriber/PCP: 2/4/2020 Miguel Angel Goodson MD OR same dept: Visit date not found OR same specialty: 2/4/2020 Miguel Angel Goodson MD Last physical: Visit date not found       Next appt within 3 mo: Visit date not found  Next physical within 3 mo: Visit date not found  Prescriber OR PCP: Miguel Angel Goodson MD  Last diagnosis associated with med order: 1. Mild intermittent asthma with exacerbation  - albuterol (PROAIR HFA;PROVENTIL HFA;VENTOLIN HFA) 90 mcg/actuation inhaler [Pharmacy Med Name: ALBUTEROL SULFATE  ( 108 (90 BAS Aerosol]; INHALE 2 PUFFS EVERY 4 HOURS AS NEEDED FOR WHEEZING OR SHORTNESS OF BREATH.//IB ZAUG NQUS 2 PAS, 4 TEEV SIV IB ZAUG YOG HAWB POB.  Dispense: 18 g; Refill: 6    If protocol passes may refill for 6 months if within 3 months of last provider visit (or a total of 9 months). If patient requesting >1 inhaler per month refill x 6 months and have patient make appointment with provider.

## 2021-06-18 NOTE — PROGRESS NOTES
Speech Language/Pathology  Videofluoroscopic Swallow Study       Problem:  Patient Active Problem List   Diagnosis     Anaphylaxis     Hiatal Hernia     Chest Pain     Latent Tuberculosis     Left Ventricular Hypertrophy     Diastolic Dysfunction     Primary Vitiligo     Fibrothorax       Onset Date: 5/15/2018 (order date)  Reason for Evaluation: Assess for dysphagia  Pertinent History: As above. Recent c/o shortness of breath. Questionable hx of TB (1985).  Current Diet: Regular textures and thin liquids  Baseline Diet: Regular textures and thin liquids    Patient is a 47-year-old male referred due to concerns of dysphagia with possible aspiration. Patient's primary concern is shortness of breath. He denies any difficulty swallowing, but does acknowledge experiencing occasional episodes of aspiration. He states that these are rare. The purpose of this study is to evaluate the oropharyngeal phase of patient's swallow and determine his aspiration risk.    Patient presents as alert and cooperative during this evaluation.   An  was not applicable    Patient was given honey-thick, puree, and thin consistencies of barium, as well as a solid (barium-coated cj cracker).    Oral Phase:    Dentition/Oral hygiene: Patient's dentition is intact. Oral hygiene was good.    Bolus prep and oral control were not impaired. Mastication was safe and effective and the patient used rotary chewing.    Anterior-Posterior transit was not impaired.    Premature spillage did not occur with any consistency.    Tongue base retraction was not impaired.    Oral stasis did not occur with any consistency.    Pharyngeal Phase:    Aspiration did not occur with any consistency.     Laryngeal penetration did not occur with any consistency.    Swallow response was timely with all consistencies.     Epiglottic movement was complete consistently across texture trials.    Pharyngeal stasis did not occur with any consistency.    Pharyngeal  constriction was not impaired.    Hyolaryngeal elevation was not impaired. Hyolaryngeal excursion was not impaired.    Cricopharyngeal function was not impaired. Cervical esophageal function was not impaired.    Assessment:    Patient demonstrated no oral dysphagia and no pharyngeal dysphagia.    Patient is at minimal aspiration risk with all intake.    Rehab potential is good based on prior level of function and evaluation results.    Recommendations:    Plan: Continue current diet of Regular textures and thin liquids    Strategies: Patient to follow standard safe swallowing precautions, including sitting fully upright for all intake, eating slowly, and taking small bites and sips.    Speech Therapy is not indicated at this time    Referrals: N/A     Reviewed history of swallow problem with patient and verbally explained roles of SLP and radiologist. Verbally explained process of VFSS prior to administration of barium. Verbally explained results and recommendations to patient. SLP answered questions and stated that patient's referring provider, Dr. Case, as well as his PCP, Dr. Goodson, will have access to this report in the EMR. Patient verbalized understanding.    25 dysphagia minutes    Lulu Gregg MA, CCC-SLP

## 2021-06-18 NOTE — PROGRESS NOTES
"Pulmonary Clinic Consult Note  Date of Service: 5/15/2018    Reason for Consultation: Shortness of breath    History:     HPI: Patient is a pleasant 47-year-old male, lifelong smoker, was referred here for evaluation of shortness of breath.    Patient has known history of ? TB back in  where he was treated for a few months. He notes that at the time, they took fluid from his left lung. He tells me this happened in [place].     He was referred her for sob. He notes that his sob has been present for a few months. He is able to walk about a mile before his has to rest. He can go up 1 flight of stairs. He denies any wheezing. He denies any cough. He notes that sometimes he aspirates. He denies any hospitalizations for respiratory related illnesses. He is a lifelong nonsmoker. He tells me that 2 years ago he was exposed to smoke at a house he lived in. He bought cans that release smoke from [employer] in order to get rid of rodents. He was not hospitalized at the time but notes that he had a cough.     Pt notes that he has a treadmill at home and he runs for about 10 minutes every day.     PMHx/PSHx:  H/o of TB back in .   Hiatal hernia  History of diastolic dysfunction      Social Hx:  Lifelong non-smoker  Works as a , cutting sheet metal. He does not wear or need to wear a mask.    Review of Systems - 10 point review of system negative except for what is mentioned in the HPI.  Meds and Allergies:  See EHR for the updated medication list and Allergies. These were reviewed.     Family Hx:   No family history of lung cancer  Father   Mother is 91 years old    Exam/Data:   /84  Pulse 68  Resp 20  Ht 4' 10\" (1.473 m)  Wt 135 lb (61.2 kg)  SpO2 96% Comment: RA  BMI 28.22 kg/m2    EXAM:  GEN: comfortable, NAD  HEENT: NCAT, EMOI, mmm  LN: no cervical LAD   CVS: S1S2, RRR  Lung: decreased BS on the left  Abd: soft, nt, + BS. No masses  Ext: no c/c/e  Vasc: intact radial pulses " bilaterally  Neuro: nonfocal  Skin: no visible rash  Musculoskeletal: FROM all extremities  Psych: normal affect    DATA    PFT's on 5/15/2018L  FEV1 57% predicted, FVC 64% predicted, ratio 75.  There is significant reversibility.  TLC 72% predicted, RV 70% predicted, DLCO 59% predicted.    CXR on 11/2017:  FINDINGS: Heart size upper range of normal in size. Pulmonary vascularity within normal limits. Extensive pleural calcification left mid and lower hemithorax with volume loss, unchanged. The right lung is clear. Scoliosis.    CT chest on 5/2015:  CONCLUSION:  1.  No pulmonary embolism.  2.  Stable chronic calcified left pleural fibrothorax with rounded atelectasis.  3.  Stable hiatal hernia.       Assessment/Plan:   Mary Arguello is a 47 y.o. male, lifelong non-smoker, was referred here for evaluation of shortness of breath. He has scarring on the left side likely related to infectious process back in 1985 when he was in [place] possibly related to Tb. His PFT's are c/w restrictive lung disease that is likely related to the calcification of his pleura on the left. He is fairly active still and is not very limited. pft's also show reversibility. His CT chest does show chronic calcified left pleural fibrothorax and rounded atelectasis that is unchanged.     Recommendations:  Encouraged to albuterol inhaler  Stay active  Swallow evaluation    FOLLOW UP: 6 months    Lloyd Case MD  Pulmonary and Critical Care Medicine  5/15/2018        No Known Allergies    Medications:     Current Outpatient Prescriptions   Medication Sig Dispense Refill     albuterol (VENTOLIN HFA) 90 mcg/actuation inhaler INHALE 2 PUFFS BY MOUTH EVERY 6 HOURS AS NEEDED FOR WHEEZING./ IB HNUB NQUS 2 PAS TXHUA 6 TEEV YOG HAWB POB 18 g 4     chlorhexidine (PERIDEX) 0.12 % solution        No current facility-administered medications for this visit.        Much or all of the text in this note was generated through the use of the Dragon Dictate  voice-to-text software. Errors in spelling or words which seem out of context are unintentional. Sound alike errors, in particular, may have escaped editing.

## 2021-06-22 NOTE — PROGRESS NOTES
"ASSESMENT AND PLAN:  Diagnoses and all orders for this visit:    1. F/u Hospitalization for Acute respiratory failure with hypoxia (H)  -  Pt was Hospittalized at [facility] for Acute asthma exacerbation and Acute respiratory failure, on 11/28- 12/1/18.   -  Reviewed and discussed hospital notes with Pt.    -  VS normal and stable today.  SpO2 92% with mild wheezing of right  lung; no crackles. Pt denies fever/chills.  Pt reports feeling much better since given  Ipratropium-albuterol (DUO NEB) nebulizer and is only using Albuterol inhaler once in 1-2 days.   -  Hospital recommended referral to Pulmonary.  -  Continue with current meds.  Discussed indications for emergent f/u.  -  Pt already has Asthma f/u appt on 3/26/18 with Dr. Goodson.   Ordered:  -     Ambulatory referral to Pulmonology    2. Immunizations  -  Pt endorses having flu shot at work already.  Reviewed Medical/Social history and Medications.  No new changes.  Discussed indications for emergent medical attention and routine F/u.  Patient/Parent/Guardian engaged in decision making process and verbalized understanding of the options discussed and agreed with the final treatment plan.     SUBJECTIVE:  Mary Arguello is a 47 y.o. male who presents  for evaluation of his Hospital Visit Follow Up for Asthma exacerbation and Respiratory failure with hypoxia on 11/28-12/1.    Hospital workup was negative for Pneumonia and influenza.  Pt was given IV steroids tapered and empiric tx with Zithromax.  Pt reports feeling much better since tx and Duoneb, \"I have only used by albuterol inhaler twice in the past 3-4 days and don't feel short of breath anymore.\"   Discussed results and referral to Pulmonary per Hospital recommendation.    Pt. denies fever/chills, cough, SOB, CP, n/v, abdominal pain, diarrhea/constipation, hematemesis.   Pt state he wheezes a bit more if he sleeps on his left side but overall he is feeling well.     ROS:  Comprehensive Review of Systems " "Negative except stated in HPI.     Past Medical History:   Diagnosis Date     Hiatal hernia      Patient Active Problem List   Diagnosis     Anaphylaxis     Hiatal Hernia     Chest Pain     Latent Tuberculosis     Left Ventricular Hypertrophy     Diastolic Dysfunction     Primary Vitiligo     Fibrothorax     Acute asthma exacerbation     Acute respiratory failure with hypoxia (H)     Hyperglycemia     Tachycardia     Current Outpatient Medications   Medication Sig Dispense Refill     albuterol (PROAIR HFA;PROVENTIL HFA;VENTOLIN HFA) 90 mcg/actuation inhaler Inhale 2 puffs every 4 (four) hours as needed for wheezing. 1 Inhaler 0     ipratropium-albuterol (DUO-NEB) 0.5-2.5 mg/3 mL nebulizer Take 3 mL by nebulization 3 (three) times a day for 21 doses. 63 mL 0     omeprazole (PRILOSEC) 20 MG capsule Take 1 capsule (20 mg total) by mouth daily before breakfast. 7 capsule 0     predniSONE (DELTASONE) 20 MG tablet Take 40 mg by mouth daily for 2 days, THEN 20 mg daily for 2 days, THEN 10 mg daily for 2 days. 7 tablet 0     chlorhexidine (PERIDEX) 0.12 % solution Apply 15 mL to the mouth or throat 2 (two) times a day as needed .             metFORMIN (GLUCOPHAGE) 500 MG tablet Take 1 tablet (500 mg total) by mouth daily with breakfast. (Patient not taking: Reported on 12/3/2018.      ) 14 tablet 0     nebulizers Misc Use with albuterol nebules as needed 1 each 0     No current facility-administered medications for this visit.      Social History     Tobacco Use   Smoking Status Never Smoker   Smokeless Tobacco Never Used     OBJECTIVE: /66 (Patient Site: Right Arm, Patient Position: Sitting, Cuff Size: Adult Regular)   Pulse 86   Temp 98  F (36.7  C) (Oral)   Resp 20   Ht 4' 10\" (1.473 m)   Wt 134 lb (60.8 kg)   SpO2 92%   BMI 28.01 kg/m     No results found for this or any previous visit (from the past 24 hour(s)).    PHYSICAL:  General Alert, awake, not in acute distress.   CV Normal S1 & S2. No murmurs. "   RESP Non-labored, RRR, CTAB. No crackles.  Mild wheezing of Right lung.        Karan Brandon PA-C

## 2021-06-22 NOTE — PROGRESS NOTES
ASSESMENT AND PLAN:  Diagnoses and all orders for this visit:    1. Medication check/refill  -  Pt reports Duo-Neb controls his Moderate Asthma more effectively.  Pt is using Duo-Neb 2x/day and has not night time awakenings. He uses albuterol inhaler once or twice a weeks.    -  Refill requested  -  He has f/u appt with Dr. Goodson on 3/5/19   Refill:  -     ipratropium-albuterol (DUO-NEB) 0.5-2.5 mg/3 mL nebulizer; Take 3 mL by nebulization 2 (two) times a day as needed.  Dispense: 180 mL; Refill: 2  -     albuterol (PROAIR HFA;PROVENTIL HFA;VENTOLIN HFA) 90 mcg/actuation inhaler; Inhale 2 puffs every 4 (four) hours as needed for wheezing.  Dispense: 2 Inhaler; Refill: 2    2. Health Maintenance  -  Pt declines appt for Annual exam.     Reviewed Medical/Social history and Medications. No new changes.  Discussed indications for emergent medical attention and routine F/u.  Patient/Parent/Guardian engaged in decision making process and verbalized understanding of the options discussed and agreed with the final treatment plan.     SUBJECTIVE:  Mary Arguello is a 47 y.o. male who presents  for evaluation of his Medication Check.    Pt states his Duo-neb gives him better control of his asthma.  He endorses using it about 2x/day.  He states only using albuterol inhaler once or twice a week.   Pt denies new or worsening sxs and has no health concerns.      Pt declines f/u for annual physical and will rtc when he wants to.      ROS:  Comprehensive Review of Systems Negative except stated in HPI.     Past Medical History:   Diagnosis Date     Hiatal hernia      Patient Active Problem List   Diagnosis     Anaphylaxis     Hiatal Hernia     Chest Pain     Latent Tuberculosis     Left Ventricular Hypertrophy     Diastolic Dysfunction     Primary Vitiligo     Fibrothorax     Acute asthma exacerbation     Acute respiratory failure with hypoxia (H)     Hyperglycemia     Tachycardia     Current Outpatient Medications   Medication Sig  "Dispense Refill     albuterol (PROAIR HFA;PROVENTIL HFA;VENTOLIN HFA) 90 mcg/actuation inhaler Inhale 2 puffs every 4 (four) hours as needed for wheezing. 2 Inhaler 2     CONTOUR NEXT TEST STRIPS strips USE AS DIRECTED ONCE DAILY    1 HNUB SIV 1 ZAUG RAWS LI QHIA  0     nebulizers Misc Use with albuterol nebules as needed 1 each 0     TRUEPLUS LANCETS 30 gauge Misc USE AS DIRECTED ONCE DAILY    1 HNUB SIV 1 ZAUG RAWS LI QHIA  0     chlorhexidine (PERIDEX) 0.12 % solution Apply 15 mL to the mouth or throat 2 (two) times a day as needed .             ipratropium-albuterol (DUO-NEB) 0.5-2.5 mg/3 mL nebulizer Take 3 mL by nebulization 2 (two) times a day as needed. 180 mL 2     metFORMIN (GLUCOPHAGE) 500 MG tablet Take 1 tablet (500 mg total) by mouth daily with breakfast. (Patient not taking: Reported on 12/3/2018.      ) 14 tablet 0     omeprazole (PRILOSEC) 20 MG capsule Take 1 capsule (20 mg total) by mouth daily before breakfast. (Patient not taking: Reported on 12/14/2018.      ) 7 capsule 0     No current facility-administered medications for this visit.      Social History     Tobacco Use   Smoking Status Never Smoker   Smokeless Tobacco Never Used     OBJECTIVE: /86   Pulse 68   Temp 97.8  F (36.6  C) (Oral)   Resp 20   Ht 4' 10\" (1.473 m)   Wt 135 lb 8 oz (61.5 kg)   SpO2 96%   BMI 28.32 kg/m     No results found for this or any previous visit (from the past 24 hour(s)).    PHYSICAL:  General Alert, awake, not in acute distress.   HEENT:             -Head Atraumatic, normocephalic.            -Eyes PERRL, no erythema, discharge, conjunctiva clear.             -Ears TMs intact, no drainage, no erythema or edema.            -Nose    Nostrils patent,  no edema.            -Throat Oropharynx without edema, erythema.  Uvula midline, no deviation.             -Neck Neck FROM, no adenopathy.  Thyroid not visibly enlarged.   CV Normal S1 & S2. No murmurs.   RESP Non-labored, RRR, CTAB. No wheezes or " crackles.        Karan Brandon PA-C

## 2021-06-23 NOTE — TELEPHONE ENCOUNTER
Prior authorization team: Please apply for prior authorization for this medication ASAP.  I called the pharmacy; the alternative medications that they cover are on long term back order and not available.  A combination steroid and long-acting beta agonist were recommended by pulmonology at his last hospitalization.  He is at high risk of rehospitalization if not on this medication.      Of note, I did send a prescription today for Arnuity Ellipta, which is fluticasone alone.  This is not as good as the combination medication recommended, but I did not want him to go without any medication and thus increase his symptoms and risk for rehospitalization.

## 2021-06-23 NOTE — PROGRESS NOTES
ASSESMENT AND PLAN:  Diagnoses and all orders for this visit:    1. F/u, Hospitalization  -  Pt was seen at [facility] and hospitalized from 12/28-12/31 for Pneumonia,and Acute Asthma exacerbation.     -  Pt has finished hospital Abx and was given Fluticasone-salmeterol 232-14mcg/actuation, but was not able to pick meds.  -   Lungs today with mild wheezing, and normal and stable VS, SpO2 96% and with no work of breathing.   ACT=8.  -  I called Pt's Pharmacy and they state Pt's insurance does not cover  Dose given by Hospital, however does cover the Fluticasone-Salmeterol 250/50mcg/actuation.    I gave Pharmacy verbal order for inhaler above with same instructions from hospital (2 inhalation 2 times daily).  -  Pt has appt with Pulmonology on 1/22/19 and will discuss how inhaler is working for him.  -  F/u in 1 month .  Discussed indications for emergent f/u.    Reviewed Medical/Social history and Medications. See new changes above.     Over 25 minutes total time spent with patient, with more than 50% in counseling and coordination of care on the above issues.   Discussed indications for emergent medical attention and routine F/u.  Patient/Parent/Guardian engaged in decision making process and verbalized understanding of the options discussed and agreed with the final treatment plan.     SUBJECTIVE:  Mary Arguello is a 48 y.o. male who presents for Fu of Hospitalization at [facility] 12/28 to 12/31.    Pt dx with acute asthma exacerbation and Pneumonia. Abx given.  Pt reports finishing Abx and requesting refill.  It told him he does not need refills if he has already finished with tx plan, unless he is having fevers/chills or evidence of recurrent Pneumonia. He states he is feeling much better and not having fever/chills.   He reports some shortness of breath bc he was not able to  new inhaler that was prescribed him.  I told him I would call his Pharmacy and get back to him.  Discussed indications for emergent  f/u.   Pt denies fever/chills, wheezing, CP, n/v, abdominal pain, diarrhea/constipation.     ROS:  Comprehensive Review of Systems Negative except stated in HPI.     Past Medical History:   Diagnosis Date     Fibrothorax     Scarring left lung     Hiatal hernia      TB lung, latent 1985     Patient Active Problem List   Diagnosis     Anaphylaxis     Hiatal Hernia     Chest Pain     Latent Tuberculosis     Left Ventricular Hypertrophy     Diastolic Dysfunction     Primary Vitiligo     Fibrothorax     Acute asthma exacerbation     Acute respiratory failure with hypoxia (H)     Hyperglycemia     Tachycardia     Asthma exacerbation attacks     Community acquired pneumonia of right lower lobe of lung (H)     Current Outpatient Medications   Medication Sig Dispense Refill     albuterol (PROAIR HFA;PROVENTIL HFA;VENTOLIN HFA) 90 mcg/actuation inhaler Inhale 2 puffs every 4 (four) hours as needed for wheezing. 2 Inhaler 2     CONTOUR NEXT TEST STRIPS strips USE AS DIRECTED ONCE DAILY    1 HNUB SIV 1 ZAUG RAWS LI QHIA  0     ipratropium-albuterol (DUO-NEB) 0.5-2.5 mg/3 mL nebulizer Take 3 mL by nebulization 2 (two) times a day as needed. 180 mL 2     nebulizers Misc Use with albuterol nebules as needed 1 each 0     predniSONE (DELTASONE) 10 mg tablet Take 40 mg by mouth daily for 1 day, THEN 30 mg daily for 3 days, THEN 20 mg daily for 3 days, THEN 10 mg daily for 3 days. 22 tablet 0     TRUEPLUS LANCETS 30 gauge Misc USE AS DIRECTED ONCE DAILY    1 HNUB SIV 1 ZAUG RAWS LI QHIA  0     fluticasone-salmeterol 232-14 mcg/actuation AePB Inhale 2 Inhalation 2 (two) times a day. 3 each 0     No current facility-administered medications for this visit.      Social History     Tobacco Use   Smoking Status Never Smoker   Smokeless Tobacco Never Used   Tobacco Comment    no passive exposure     OBJECTIVE: /76 (Patient Site: Left Arm, Patient Position: Sitting, Cuff Size: Adult Regular)   Pulse 83   Temp 97.9  F (36.6  C)  "(Oral)   Ht 4' 11\" (1.499 m)   Wt 132 lb 8 oz (60.1 kg)   SpO2 96%   BMI 26.76 kg/m     No results found for this or any previous visit (from the past 24 hour(s)).    PHYSICAL:  General Alert, awake, not in acute distress.   CV Normal S1 & S2. No murmurs.   RESP Non-labored, RRR, CTAB. No crackles.  Mild expiratory wheezing in all lung quadrants.    EXTREMITY No pedal edema.        Karan Brandon PA-C         "

## 2021-06-23 NOTE — PROGRESS NOTES
PULMONARY CLINIC FOLLOW UP NOTE    History:     HPI: Mary Arguello is a 48 y.o. male who is here for follow up of cough.  He was initially seen in clinic on May 2018.  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  When he was initially seen, PFTs were suggestive of reversible airway disease and asthma. Patient was started on albuterol inhaler.  Patient was hospitalized in December with asthma exacerbation.  He has since been started on steroid inhaler.    Interval History: pt is currently using advair 100/50 two times a day and albuterol inhaler as needed. He roughly uses it about a few times a week. He notices that his symptoms are exacerbated by cold. He is able to walk a few blocks without shortness of breath. He exercises about 15 minutes per day. He has an occasional cough that is productive. No hemoptysis. He denies any nocturnal symptoms.    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    ROS: 10 point review of system done. Pertinent findings are noted in the HPI.    Exam/Data:   BP 92/64   Pulse 80   Resp 20   Wt 132 lb 9.6 oz (60.1 kg)   SpO2 95% Comment: RA  BMI 26.78 kg/m  , Body mass index is 26.78 kg/m .  GEN: comfortable, NAD  HEENT: NCAT, EMOI, mmm  CVS: S1S2, RRR  Lung: decreased bs left bases  Abd: soft, nt, + BS. No masses  Ext: no c/c/e  Neuro: nonfocal  Skin: no visible rash  Vasc: intact radial pulses bilaterally  Musculoskeletal: FROM all extremities  Psych: normal affect    Data:   Labs and Imaging personally reviewed.  Pertinent findings include:    Cta Chest Pe Run  Result Date: 12/29/2018  CTA CHEST PE RUN 12/28/2018 11:52 PM INDICATION: Chest pain, acute, pe suspected  hypoxia, tachycardia, chest pain with exertion TECHNIQUE: Helical acquisition through the chest was performed during the arterial phase of contrast enhancement using IV contrast. 2D and 3D reconstructions were performed by the CT technologist. Dose reduction techniques were used. IV CONTRAST: Iohexol (Omni) 100 mL COMPARISON: CT from 5/7/2015. Chest radiograph from 12/28/2018. FINDINGS: ANGIOGRAM CHEST: Pulmonary arteries are normal caliber and negative for pulmonary emboli. Normal caliber thoracic aorta with no dissection or aneurysm. RV/LV RATIO: N/A LUNGS AND PLEURA: Redemonstrated calcified chronic left pleural effusion and fibrothorax with chronic left lower lobe basal segment rounded atelectasis. Increased atelectasis from comparison study. There is bronchial wall thickening throughout the right lung but most prevalent in the lower lung zone, with numerous centrilobular nodular infiltrates in the right middle and lower lobes. No visible pneumothorax. MEDIASTINUM: Heart size within normal limits. Moderate hiatal hernia. No pericardial effusion. LIMITED UPPER ABDOMEN: Right upper pole renal cyst. No acute findings. MUSCULOSKELETAL: No acute osseous findings.     CONCLUSION: 1.  Negative for pulmonary embolism or thoracic aortic aneurysm or dissection. 2.  Right-sided bronchial wall thickening, most prevalent in the lower lung, with centrilobular nodular infiltrates in the middle and lower lobes consistent with acute infectious or inflammatory etiology. 3.  Redemonstrated left-sided fibrothorax with increased adjacent round atelectasis. 4.  Moderate hiatal hernia.    PFTs on May, 2018:  Impression:  Full Pulmonary Function Test is abnormal.    PFTs are consistent with mild restrictive disease.  Spirometry is consistent with reversibility.  Diffusion capacity when corrected for hemoglobin is moderately reduced    Swallow evaluation on 5/2018: normal.    Assessment/Plan:       Mary Arguello is a 48 y.o. male,  lifelong non-smoker with history of chronic left-sided fibrothorax with rounded atelectasis, reversible airway disease likely secondary to asthma, hiatal hernia, and history of treatment for ? latent TB back in 1985 is here for follow-up.    Recommendations:  Continue using advair  Gargle/swish/spit after using advair  Flutter valve teaching provided  Continue albuterol inhaler    FOLLOW UP: 6 months    Lloyd Case MD  Pulmonary and Critical Care Medicine  Electronically Signed on 1/22/2019    Current Outpatient Medications   Medication Sig Dispense Refill     ADVAIR DISKUS 250-50 mcg/dose DISKUS 1 puff 2 (two) times a day.  3     albuterol (PROAIR HFA;PROVENTIL HFA;VENTOLIN HFA) 90 mcg/actuation inhaler Inhale 2 puffs every 4 (four) hours as needed for wheezing. 2 Inhaler 2     CONTOUR NEXT TEST STRIPS strips USE AS DIRECTED ONCE DAILY    1 HNUB SIV 1 ZAUG RAWS LI QHIA  0     nebulizers Misc Use with albuterol nebules as needed 1 each 0     TRUEPLUS LANCETS 30 gauge Misc USE AS DIRECTED ONCE DAILY    1 HNUB SIV 1 ZAUG RAWS LI QHIA  0     ipratropium-albuterol (DUO-NEB) 0.5-2.5 mg/3 mL nebulizer Take 3 mL by nebulization 2 (two) times a day as needed. 180 mL 2     No current facility-administered medications for this visit.      No Known Allergies    Meds and Allergies: See EHR for the updated medication list and Allergies. These were reviewed.     Much or all of the text in this note was generated through the use of the Dragon Dictate voice-to-text software. Errors in spelling or words which seem out of context are unintentional. Sound alike errors, in particular, may have escaped editing.

## 2021-06-23 NOTE — PROGRESS NOTES
Pt reports his insurance did not cover Fluticasone-Salmeterol 232/14 and was not able to .  Called Pt's pharmacy, [facility], and asked for which brand was covered. Pharmacist states the one prescribed is rare and not covered but recommended 250/50 dose instead which is common and usually covered by insurance. Given Pt's hx of uncontrolled Asthma, recent hospitalization, and current wheezing today we will try this dose. Pt has appt with Pulmonary on 1/22/19 and f/u with me in 1 month or sooner if not effective as discussed in OV.

## 2021-06-23 NOTE — TELEPHONE ENCOUNTER
Medication Question or Clarification  Who is calling: Pharmacy: [facility]   What medication are you calling about?: see below   What dose do you take?: see below   How often are you taking the medication?:  See below   Who prescribed the medication?: See below   What is your question/concern?:   RX is on BACK order- no other med covered for patient ??   need alternative treatment   Pharmacy:  [facility]   Okay to leave a detailed message?: Yes  Site CMT - Please call the pharmacy to obtain any additional needed information.               Summary: Inhale 2 Inhalation 2 (two) times a day., Starting Mon 12/31/2018, Until Fri 1/11/2019, Normal   Dose, Frequency: 2 Inhalation, 2 times daily  Start: 12/31/2018  End: 1/11/2019  Ord/Sold: 12/31/2018 (O)  Report  Adh:   Taking:   Long-term:   Pharmacy: [facility] - [place], [place] - [address] [place] [address] Avenue  Med Dose History             Note (1/10/2019): Pharmacy did not have it per patient.             Patient Sig: Inhale 2 Inhalation 2 (two) times a day.       Ordered on: 12/31/2018       Authorized by: ELIZABETH MELO       Dispense: 3 each       Refills: 0 ordered

## 2021-06-24 NOTE — PROGRESS NOTES
ASSESMENT AND PLAN:  Diagnoses and all orders for this visit:    Intermittent asthma without complication, unspecified asthma severity with history of chronic lung scarring as detailed in previous notes  -     Pneumococcal polysaccharide vaccine 23-valent 1 yo or older, subq/IM  -     albuterol (PROAIR HFA;PROVENTIL HFA;VENTOLIN HFA) 90 mcg/actuation inhaler  Dispense: 2 Inhaler; Refill: 6   Reviewed the most recent pulmonary medicine consultation with the patient, he will continue Advair twice daily and will follow-up with pulmonary medicine again in 6 months.    Glucose intolerance  Reviewed the A1c of 6.3 from his recent hospitalization.  Counseled the patient on high fiber low sugar and starch diet changes, daily exercise, and we will recheck this again in 6 months.    Diaphragmatic hernia without obstruction and without gangrene with reflux symptoms  Discussed options with the patient, reviewed dietary interventions and will also start omeprazole as prescribed below.  If symptoms do not resolve completely follow-up in clinic sooner.  -     omeprazole (PRILOSEC) 20 MG capsule  Dispense: 30 capsule; Refill: 6          SUBJECTIVE: 48-year-old male comes in today for follow-up on his asthma.  Since being started on Advair he has been taking it twice a day and tolerating it well.  He has not had any recurrence of asthma exacerbation.  He is not having shortness of breath.  He does have albuterol to use when needed but needs refills of this.  His main symptom concern today is that he gets some occasional sounds that seem to come from the neck and upper chest when he breathes.  In addition, he gets nighttime symptoms of burning in his throat and some acid taste in his mouth 2-3 times per week when laying down.  He notices this is more likely to happen if he has eaten recently.  It is bothering him enough that he would like to consider using medication to help reduce the symptoms.  He does have a known history of  "hiatal hernia.  No blood in the stool or black tarry stools.  No vomiting.    Past Medical History:   Diagnosis Date     Fibrothorax     Scarring left lung     Hiatal hernia      TB lung, latent 1985     Patient Active Problem List   Diagnosis     Anaphylaxis     Hiatal Hernia     Chest Pain     Latent Tuberculosis     Left Ventricular Hypertrophy     Diastolic Dysfunction     Primary Vitiligo     Fibrothorax     Acute asthma exacerbation     Acute respiratory failure with hypoxia (H)     Hyperglycemia     Tachycardia     Asthma exacerbation attacks     Community acquired pneumonia of right lower lobe of lung (H)     Current Outpatient Medications   Medication Sig Dispense Refill     ADVAIR DISKUS 250-50 mcg/dose DISKUS 1 puff 2 (two) times a day.  3     albuterol (PROAIR HFA;PROVENTIL HFA;VENTOLIN HFA) 90 mcg/actuation inhaler Inhale 2 puffs every 4 (four) hours as needed for wheezing. 2 Inhaler 6     CONTOUR NEXT TEST STRIPS strips USE AS DIRECTED ONCE DAILY    1 HNUB SIV 1 ZAUG RAWS LI QHIA  0     ipratropium-albuterol (DUO-NEB) 0.5-2.5 mg/3 mL nebulizer Take 3 mL by nebulization 2 (two) times a day as needed. 180 mL 2     nebulizers Misc Use with albuterol nebules as needed 1 each 0     omeprazole (PRILOSEC) 20 MG capsule Take 1 capsule (20 mg total) by mouth daily. 30 capsule 6     TRUEPLUS LANCETS 30 gauge Misc USE AS DIRECTED ONCE DAILY    1 HNUB SIV 1 ZAUG RAWS LI QHIA  0     No current facility-administered medications for this visit.      Social History     Tobacco Use   Smoking Status Never Smoker   Smokeless Tobacco Never Used   Tobacco Comment    no passive exposure       OBJECTICE: /80 (Patient Site: Left Arm, Patient Position: Sitting, Cuff Size: Adult Regular)   Pulse 75   Temp 98  F (36.7  C) (Oral)   Resp 16   Ht 4' 11\" (1.499 m)   Wt 135 lb (61.2 kg)   SpO2 98%   BMI 27.27 kg/m       No results found for this or any previous visit (from the past 24 hour(s)).     GEN-alert, " appropriate, in no apparent distress   HEENT-oropharynx is clear, neck is supple with no palpable mass or lymphadenopathy   CV-regular rate and rhythm with no murmur   RESP-lungs clear to auscultation, currently no wheezing   ABDOMINAL-soft, nontender, no palpable masses organomegaly   EXTREM-warm with no edema   SKIN-no ulcers or vesicles      Miguel Angel Goodson

## 2021-06-25 NOTE — TELEPHONE ENCOUNTER
Fax received from [facility], they have started the Prior Authorization Process via Cover My Meds    CoverMyMeds Key: [id number]    Medication Name: Fluticasone-Salmeterol 250-50mcg    Insurance Plan: [employer]  PBM:   Patient ID: not provided on fax    Please complete the PA process

## 2021-06-25 NOTE — TELEPHONE ENCOUNTER
Please inform the patient that I sent to the pharmacy the prescription for the formulary covered alternative, 2 puffs twice per day.

## 2021-06-25 NOTE — TELEPHONE ENCOUNTER
Central PA team  [phone]  Pool: HE PA MED ([id number])          PA has been initiated.       PA form completed and faxed insurance via Cover My Meds     Key:  [id number] - Rx #: [id number]     Medication:  Fluticasone-Salmeterol 250-50MCG/DOSE IN AEPB    Insurance:  [employer] [place] PMAP        Response will be received via fax and may take up to 5-10 business days depending on plan

## 2021-06-28 NOTE — PROGRESS NOTES
Progress Notes by Dana Valdez MD at 11/8/2019  9:10 AM     Author: Dana Valdez MD Service: -- Author Type: Physician    Filed: 11/8/2019 10:06 AM Encounter Date: 11/8/2019 Status: Signed    : Dana Valdez MD (Physician)           Thank you, Dr. Goodson, for asking us to see Mary Arguello at the [facility].      Assessment/Recommendations   Assessment:    1.  Dyspnea on exertion: Dyspnea likely secondary to asthma however cannot exclude cardiac cause.  2.  Asthma exacerbation with wheezing  3.  Atypical chest pain    Plan:  1.  Follow-up with pulmonary as planned  2.  Exercise nuclear stress test  3.  Echo cardiogram to evaluate for structural heart disease    Follow-up in a month       History of Present Illness    Mr. Mary Arguello is a 48 y.o. male with history of asthma diagnosed last year who I am seen today for initial consultation.  He was recently in the ED with complaints of worsening wheezing and shortness of breath with fevers.  Bedside echocardiogram done by the ED physician reportedly showed no pericardial effusion.  And one noted showed hyperkinetic left ventricular systolic function however in the body of their note they commented hypokinesis of the left ventricle.  Patient denies any weight gain, increased lower extremity edema orthopnea.  He reports he has continued to experience wheezing and has follow-up scheduled with pulmonary.  He denies any fevers.  He denies any chest discomfort.  Last year when he was admitted to the hospital the asthma he was complaining of occasional chest tightness.     Physical Examination Review of Systems   Vitals:    11/08/19 0859   BP: 102/80   Pulse: 98   Resp: 14     Body mass index is 27.06 kg/m .  Wt Readings from Last 3 Encounters:   11/08/19 134 lb (60.8 kg)   11/01/19 133 lb 6.4 oz (60.5 kg)   10/25/19 131 lb 6 oz (59.6 kg)       General Appearance:   alert, no apparent distress   HEENT:  no scleral  icterus; the mucous membranes are pink and moist                                  Neck: No jvd   Chest: the spine is straight and the chest is symmetric   Lungs:    Diffuse wheezing   Cardiovascular:   regular rhythm with normal first and second heart sounds and no murmurs or gallops; there are no carotid bruits.   Abdomen:  no organomegaly, masses, bruits, or tenderness; bowel sounds are present   Extremities: no cyanosis, clubbing, or edema   Skin: no xanthelasma    General: WNL  Eyes: WNL  Ears/Nose/Throat: WNL  Lungs: Cough, Shortness of Breath, Wheezing  Heart: Shortness of Breath with activity  Stomach: WNL  Bladder: WNL  Muscle/Joints: WNL  Skin: WNL  Nervous System: WNL  Mental Health: WNL     Blood: WNL     Medical History  Surgical History Family History Social History   Past Medical History:   Diagnosis Date   ? Fibrothorax     Scarring left lung   ? Hiatal hernia    ? Pneumonia    ? TB lung, latent 1985    Past Surgical History:   Procedure Laterality Date   ? NO PAST SURGERIES      No family history of premature coronary disease Social History     Socioeconomic History   ? Marital status:      Spouse name: Not on file   ? Number of children: Not on file   ? Years of education: Not on file   ? Highest education level: Not on file   Occupational History   ? Not on file   Social Needs   ? Financial resource strain: Not on file   ? Food insecurity:     Worry: Not on file     Inability: Not on file   ? Transportation needs:     Medical: Not on file     Non-medical: Not on file   Tobacco Use   ? Smoking status: Never Smoker   ? Smokeless tobacco: Never Used   ? Tobacco comment: no passive exposure   Substance and Sexual Activity   ? Alcohol use: No   ? Drug use: No   ? Sexual activity: Not on file   Lifestyle   ? Physical activity:     Days per week: Not on file     Minutes per session: Not on file   ? Stress: Not on file   Relationships   ? Social connections:     Talks on phone: Not on file     Gets  together: Not on file     Attends Hinduism service: Not on file     Active member of club or organization: Not on file     Attends meetings of clubs or organizations: Not on file     Relationship status: Not on file   ? Intimate partner violence:     Fear of current or ex partner: Not on file     Emotionally abused: Not on file     Physically abused: Not on file     Forced sexual activity: Not on file   Other Topics Concern   ? Not on file   Social History Narrative   ? Not on file          Medications  Allergies   Current Outpatient Medications   Medication Sig Dispense Refill   ? albuterol (PROAIR HFA;PROVENTIL HFA;VENTOLIN HFA) 90 mcg/actuation inhaler Inhale 2 puffs every 4 (four) hours as needed for wheezing. 2 Inhaler 6   ? albuterol (PROVENTIL) 2.5 mg /3 mL (0.083 %) nebulizer solution Take 3 mL (2.5 mg total) by nebulization every 4 (four) hours as needed for wheezing. 25 vial 2   ? budesonide (PULMICORT) 0.25 mg/2 mL nebulizer solution Take 2 mL (0.25 mg total) by nebulization 2 (two) times a day. 100 mL 2   ? ipratropium-albuterol (COMBIVENT RESPIMAT)  mcg/actuation Mist inhaler Inhale 1 puff every 4 hours as needed. 1 Inhaler 11   ? TRUEPLUS LANCETS 30 gauge Misc USE AS DIRECTED ONCE DAILY    1 HNUB SIV 1 ZAUG RAWS LI QHIA  0   ? fluticasone propion-salmeterol (ADVAIR HFA) 115-21 mcg/actuation inhaler Inhale 2 puffs 2 (two) times a day. 1 Inhaler 12   ? omeprazole (PRILOSEC) 20 MG capsule Take 1 capsule (20 mg total) by mouth daily. 30 capsule 11     No current facility-administered medications for this visit.       No Known Allergies      Lab Results    Chemistry/lipid CBC Cardiac Enzymes/BNP/TSH/INR   Lab Results   Component Value Date    CHOL 162 11/07/2017    HDL 40 11/07/2017    LDLCALC 110 11/07/2017    TRIG 62 11/07/2017    CREATININE 0.84 10/16/2019    BUN 12 10/16/2019    K 3.3 (L) 10/16/2019     10/16/2019     10/16/2019    CO2 24 10/16/2019    Lab Results   Component Value  Date    WBC 16.0 (H) 10/17/2019    HGB 13.9 (L) 10/17/2019    HCT 41.5 10/17/2019    MCV 94 10/17/2019     10/17/2019    Lab Results   Component Value Date    TROPONINI <0.01 10/16/2019    BNP <10 10/16/2019    TSH 3.57 11/28/2018

## 2021-06-28 NOTE — PROGRESS NOTES
Progress Notes by Dana Valdez MD at 12/3/2019  9:10 AM     Author: Dana Valdez MD Service: -- Author Type: Physician    Filed: 12/3/2019 10:06 AM Encounter Date: 12/3/2019 Status: Signed    : Dana Valdez MD (Physician)           Thank you, Dr. Goodson, for asking us to see Mary Arguello at the [facility].      Assessment/Recommendations   Assessment:    1.  Reactive airway disease/asthma followed by pulmonary (Dr. Case)  2.  History of latent TB in 1985  3.  Hiatal hernia  4.  Dyspnea    Plan:  1.  Patient was seen for dyspnea.  Echocardiogram showed no evidence for diastolic dysfunction, no evidence for structural heart disease.  Unlikely that his shortness of breath is secondary to a cardiac cause.  He also underwent stress testing that did not show any evidence for inducible ischemia preserved left ventricular systolic function.  Fixed defect possibly secondary to diaphragmatic attenuation.  2.  May follow up in a year or as needed       History of Present Illness    Mr. aMry Arguello is a 48 y.o. male with history of asthma, latent TB, hiatal hernia who I am seeing today in follow-up.  I saw him initially for dyspnea.  He was actively wheezing on his last visit.  He has been treated for asthma exacerbation and bronchitis and since then has been feeling much better.  He has not had any chest discomfort.  He underwent an echocardiogram that was unremarkable as well as an exercise nuclear stress test that was negative for inducible ischemia.  There was a small fixed inferior defect that could be secondary to diaphragmatic attenuation.    Echocardiogram 11/22/2019    Normal left ventricular size.The estimated left ventricular ejection fraction is 70%. This represents a normal ejection fraction. Mild hypertrophy noted. E/e' 8 to 15, which is equivocal for estimating LV filling pressures.Left ventricular diastolic function is normal.    Normal right  ventricular size and systolic function.    Normal central venous pressure.    No hemodynamically significant valvular heart abnormalities.    No previous study for comparison.         Exercise nuclear stress test 11/22/2019   There is no prior study for comparison.  ?  The nuclear stress test is abnormal.  ?  The patient is at a low risk of future cardiac ischemic events.  Nuclear stress test was negative for inducible ischemia.  ?  There is a small area of a mild degree of nontransmural infarction in the mid to basal inferolateral segment(s) of the left ventricle.  Cannot exclude diaphragmatic attenuation.  ?  The left ventricular ejection fraction at stress is greater than 70%.  ?  The patient's exercise capacity is moderately impaired.  ?  Left ventricular function is hyperdynamic.       Physical Examination Review of Systems   Vitals:    12/03/19 0921   BP: 110/76   Pulse: 80   Resp: 16     Body mass index is 26.46 kg/m .  Wt Readings from Last 3 Encounters:   12/03/19 131 lb (59.4 kg)   11/25/19 128 lb (58.1 kg)   11/22/19 134 lb (60.8 kg)       General Appearance:   alert, no apparent distress   HEENT:  no scleral icterus; the mucous membranes are pink and moist                                  Neck: No jvd   Chest: the spine is straight and the chest is symmetric   Lungs:   respirations unlabored; the lungs are clear to auscultation   Cardiovascular:   regular rhythm with normal first and second heart sounds and no murmurs or gallops   Abdomen:  no organomegaly, masses, bruits, or tenderness; bowel sounds are present   Extremities: no cyanosis, clubbing, or edema   Skin: no xanthelasma    General: WNL  Eyes: WNL  Ears/Nose/Throat: WNL  Lungs: Cough, Shortness of Breath, Wheezing  Heart: Shortness of Breath with activity  Stomach: WNL  Bladder: WNL  Muscle/Joints: WNL  Skin: WNL  Nervous System: WNL  Mental Health: WNL     Blood: WNL     Medical History  Surgical History Family History Social History   Past  Medical History:   Diagnosis Date   ? Fibrothorax     Scarring left lung   ? Hiatal hernia    ? Pneumonia    ? TB lung, latent 1985    Past Surgical History:   Procedure Laterality Date   ? NO PAST SURGERIES      No family history of premature coronary artery disease Social History     Socioeconomic History   ? Marital status:      Spouse name: Not on file   ? Number of children: Not on file   ? Years of education: Not on file   ? Highest education level: Not on file   Occupational History   ? Not on file   Social Needs   ? Financial resource strain: Not on file   ? Food insecurity:     Worry: Not on file     Inability: Not on file   ? Transportation needs:     Medical: Not on file     Non-medical: Not on file   Tobacco Use   ? Smoking status: Never Smoker   ? Smokeless tobacco: Never Used   ? Tobacco comment: no passive exposure   Substance and Sexual Activity   ? Alcohol use: No   ? Drug use: No   ? Sexual activity: Not on file   Lifestyle   ? Physical activity:     Days per week: Not on file     Minutes per session: Not on file   ? Stress: Not on file   Relationships   ? Social connections:     Talks on phone: Not on file     Gets together: Not on file     Attends Islam service: Not on file     Active member of club or organization: Not on file     Attends meetings of clubs or organizations: Not on file     Relationship status: Not on file   ? Intimate partner violence:     Fear of current or ex partner: Not on file     Emotionally abused: Not on file     Physically abused: Not on file     Forced sexual activity: Not on file   Other Topics Concern   ? Not on file   Social History Narrative   ? Not on file          Medications  Allergies   Current Outpatient Medications   Medication Sig Dispense Refill   ? albuterol (PROAIR HFA;PROVENTIL HFA;VENTOLIN HFA) 90 mcg/actuation inhaler Inhale 2 puffs every 4 (four) hours as needed for wheezing. 1 Inhaler 0   ? albuterol (PROVENTIL) 2.5 mg /3 mL (0.083 %)  nebulizer solution Take 3 mL (2.5 mg total) by nebulization every 4 (four) hours as needed for wheezing. 25 vial 2   ? albuterol (PROVENTIL) 5 mg/mL nebulizer solution Take 0.5 mL (2.5 mg total) by nebulization every 6 (six) hours as needed for wheezing. 20 mL 0   ? budesonide (PULMICORT) 0.25 mg/2 mL nebulizer solution Take 2 mL (0.25 mg total) by nebulization 2 (two) times a day. 100 mL 2   ? fluticasone propion-salmeterol (ADVAIR HFA) 230-21 mcg/actuation inhaler Inhale 2 puffs 2 (two) times a day. 1 Inhaler 12   ? fluticasone propion-salmeterol (ADVAIR) 250-50 mcg/dose DISKUS Inhale 1 puff 2 (two) times a day. 2 each 11   ? ipratropium-albuterol (COMBIVENT RESPIMAT)  mcg/actuation Mist inhaler Inhale 1 puff every 4 hours as needed. 1 Inhaler 11   ? omeprazole (PRILOSEC) 20 MG capsule Take 1 capsule (20 mg total) by mouth daily. 30 capsule 11   ? predniSONE (DELTASONE) 20 MG tablet Take 40 mg by mouth daily. 10 tablet 0     No current facility-administered medications for this visit.       No Known Allergies      Lab Results    Chemistry/lipid CBC Cardiac Enzymes/BNP/TSH/INR   Lab Results   Component Value Date    CHOL 162 11/07/2017    HDL 40 11/07/2017    LDLCALC 110 11/07/2017    TRIG 62 11/07/2017    CREATININE 0.89 11/12/2019    BUN 9 11/12/2019    K 3.3 (L) 11/12/2019     11/12/2019     11/12/2019    CO2 27 11/12/2019    Lab Results   Component Value Date    WBC 7.5 11/12/2019    HGB 14.4 11/12/2019    HCT 44.3 11/12/2019    MCV 93 11/12/2019     11/12/2019    Lab Results   Component Value Date    TROPONINI <0.01 10/16/2019    BNP <10 10/16/2019    TSH 3.57 11/28/2018

## 2021-06-30 NOTE — PROGRESS NOTES
Progress Notes by Dana Valdez MD at 3/1/2021  7:50 AM     Author: Dana Valdez MD Service: -- Author Type: Physician    Filed: 3/1/2021 10:14 AM Encounter Date: 3/1/2021 Status: Signed    : Dana Valdez MD (Physician)           Thank you, Dr. Goodson, for asking us to see Mary Arguello at the [facility].      Assessment/Recommendations   Assessment:    1.  Reactive airway disease/asthma followed by pulmonary (Dr. Case): Shortness of breath has been improved with using inhalers.  At this point no further cardiac work-up recommended.  Echocardiogram did not show any significant findings and stress test was negative for inducible ischemia.  2.  History of latent TB in 1985  3.  Hiatal hernia     Plan:   May follow up as needed     History of Present Illness    Mr. Mary Arguello is a 50 y.o. male with history of asthma, latent TB, hiatal hernia who I am seeing today in follow-up.  He denies any chest discomfort.  He has chronic issues with breathing and wheezing.  This is been alleviated with the use of inhalers.  He has been following with pulmonary for this.  Stress testing last year showed no evidence for inducible ischemia.       Physical Examination Review of Systems   Vitals:    03/01/21 0740   BP: 108/70   Pulse: 70   Resp: 14     Body mass index is 27.27 kg/m .  Wt Readings from Last 3 Encounters:   03/01/21 135 lb (61.2 kg)   02/11/20 132 lb (59.9 kg)   02/04/20 130 lb (59 kg)       General Appearance:   alert, no apparent distress   HEENT:  no scleral icterus; the mucous membranes are pink and moist                                  Neck: No jvd   Chest: the spine is straight and the chest is symmetric   Lungs:   respirations unlabored; the lungs are clear to auscultation   Cardiovascular:   regular rhythm with normal first and second heart sounds and no murmurs or gallops   Abdomen:  no organomegaly, masses, bruits, or tenderness; bowel sounds are  present   Extremities: no edema   Skin: no xanthelasma    General: WNL  Eyes: WNL  Ears/Nose/Throat: WNL  Lungs: Shortness of Breath, Wheezing  Heart: WNL  Stomach: WNL  Bladder: WNL  Muscle/Joints: WNL  Skin: WNL  Nervous System: WNL  Mental Health: WNL     Blood: WNL     Medical History  Surgical History Family History Social History   Past Medical History:   Diagnosis Date   ? Fibrothorax     Scarring left lung   ? Hiatal hernia    ? Pneumonia    ? TB lung, latent 1985    Past Surgical History:   Procedure Laterality Date   ? NO PAST SURGERIES      No family history of premature coronary artery disease Social History     Socioeconomic History   ? Marital status:      Spouse name: Not on file   ? Number of children: Not on file   ? Years of education: Not on file   ? Highest education level: Not on file   Occupational History   ? Not on file   Social Needs   ? Financial resource strain: Not on file   ? Food insecurity     Worry: Not on file     Inability: Not on file   ? Transportation needs     Medical: Not on file     Non-medical: Not on file   Tobacco Use   ? Smoking status: Never Smoker   ? Smokeless tobacco: Never Used   ? Tobacco comment: no passive exposure   Substance and Sexual Activity   ? Alcohol use: No   ? Drug use: No   ? Sexual activity: Not on file   Lifestyle   ? Physical activity     Days per week: Not on file     Minutes per session: Not on file   ? Stress: Not on file   Relationships   ? Social connections     Talks on phone: Not on file     Gets together: Not on file     Attends Quaker service: Not on file     Active member of club or organization: Not on file     Attends meetings of clubs or organizations: Not on file     Relationship status: Not on file   ? Intimate partner violence     Fear of current or ex partner: Not on file     Emotionally abused: Not on file     Physically abused: Not on file     Forced sexual activity: Not on file   Other Topics Concern   ? Not on file    Social History Narrative   ? Not on file          Medications  Allergies   Current Outpatient Medications   Medication Sig Dispense Refill   ? albuterol (PROVENTIL) 2.5 mg /3 mL (0.083 %) nebulizer solution NEBULIZE 1 VIAL EVERY 4 HOURS AS NEEDED FOR WHEEZING. 90 mL 2   ? albuterol (PROVENTIL) 5 mg/mL nebulizer solution Take 0.5 mL (2.5 mg total) by nebulization every 6 (six) hours as needed for wheezing. 20 mL 0   ? budesonide (PULMICORT) 0.25 mg/2 mL nebulizer solution NEBULIZE 2 ML 2 TIMES A DAY. 100 mL 2   ? fluticasone propion-salmeteroL (ADVAIR HFA) 230-21 mcg/actuation inhaler Inhale 2 puffs 2 (two) times a day. 1 Inhaler 3   ? montelukast (SINGULAIR) 10 mg tablet Take 1 tablet (10 mg total) by mouth at bedtime. 60 tablet 3   ? omeprazole (PRILOSEC) 20 MG capsule TAKE 1 CAPSULE 30 MINUTES BEFORE FIRST MEAL DAILY FOR STOMACH // 1 HNUB NOJ 1 LUB UA NTEJ NOJ TSHAIS PAB ALE MOB PLAB/MOB NCAUJ PLAB 30 capsule 11   ? albuterol (PROAIR HFA;PROVENTIL HFA;VENTOLIN HFA) 90 mcg/actuation inhaler Inhale 2 puffs every 4 (four) hours as needed for wheezing. 1 Inhaler 0     No current facility-administered medications for this visit.       No Known Allergies      Lab Results    Chemistry/lipid CBC Cardiac Enzymes/BNP/TSH/INR   Lab Results   Component Value Date    CHOL 162 11/07/2017    HDL 40 11/07/2017    LDLCALC 110 11/07/2017    TRIG 62 11/07/2017    CREATININE 0.89 11/12/2019    BUN 9 11/12/2019    K 3.3 (L) 11/12/2019     11/12/2019     11/12/2019    CO2 27 11/12/2019    Lab Results   Component Value Date    WBC 8.1 12/27/2019    HGB 15.2 12/27/2019    HCT 46.5 12/27/2019    MCV 89 12/27/2019     12/27/2019    Lab Results   Component Value Date    TROPONINI <0.01 10/16/2019    BNP <10 10/16/2019    TSH 3.57 11/28/2018

## 2022-03-31 DIAGNOSIS — K21.00 GASTROESOPHAGEAL REFLUX DISEASE WITH ESOPHAGITIS: ICD-10-CM

## 2022-03-31 DIAGNOSIS — Z76.0 ENCOUNTER FOR MEDICATION REFILL: Primary | ICD-10-CM

## 2022-06-21 DIAGNOSIS — J45.21 MILD INTERMITTENT ASTHMA WITH ACUTE EXACERBATION: ICD-10-CM

## 2022-06-21 DIAGNOSIS — Z76.0 ENCOUNTER FOR MEDICATION REFILL: Primary | ICD-10-CM

## 2022-06-21 RX ORDER — ALBUTEROL SULFATE 0.83 MG/ML
SOLUTION RESPIRATORY (INHALATION)
Qty: 90 ML | Refills: 2 | Status: SHIPPED | OUTPATIENT
Start: 2022-06-21 | End: 2022-08-09

## 2022-08-05 DIAGNOSIS — J45.21 MILD INTERMITTENT ASTHMA WITH ACUTE EXACERBATION: ICD-10-CM

## 2022-08-05 DIAGNOSIS — Z76.0 ENCOUNTER FOR MEDICATION REFILL: ICD-10-CM

## 2022-08-05 DIAGNOSIS — Z76.0 ENCOUNTER FOR MEDICATION REFILL: Primary | ICD-10-CM

## 2022-08-05 DIAGNOSIS — J45.21 MILD INTERMITTENT ASTHMA WITH EXACERBATION: ICD-10-CM

## 2022-08-09 RX ORDER — ALBUTEROL SULFATE 0.83 MG/ML
SOLUTION RESPIRATORY (INHALATION)
Qty: 90 ML | Refills: 2 | Status: SHIPPED | OUTPATIENT
Start: 2022-08-09 | End: 2022-10-27

## 2022-08-09 RX ORDER — ALBUTEROL SULFATE 90 UG/1
AEROSOL, METERED RESPIRATORY (INHALATION)
Qty: 18 G | Refills: 6 | Status: SHIPPED | OUTPATIENT
Start: 2022-08-09 | End: 2022-10-26

## 2022-10-26 ENCOUNTER — OFFICE VISIT (OUTPATIENT)
Dept: FAMILY MEDICINE | Facility: CLINIC | Age: 52
End: 2022-10-26
Payer: COMMERCIAL

## 2022-10-26 VITALS
HEIGHT: 60 IN | SYSTOLIC BLOOD PRESSURE: 124 MMHG | TEMPERATURE: 97.2 F | DIASTOLIC BLOOD PRESSURE: 78 MMHG | OXYGEN SATURATION: 99 % | BODY MASS INDEX: 25.13 KG/M2 | RESPIRATION RATE: 16 BRPM | WEIGHT: 128 LBS | HEART RATE: 64 BPM

## 2022-10-26 DIAGNOSIS — R11.0 CHRONIC NAUSEA: ICD-10-CM

## 2022-10-26 DIAGNOSIS — R73.9 HYPERGLYCEMIA: ICD-10-CM

## 2022-10-26 DIAGNOSIS — Z13.220 SCREENING FOR HYPERLIPIDEMIA: ICD-10-CM

## 2022-10-26 DIAGNOSIS — Z12.11 SCREEN FOR COLON CANCER: ICD-10-CM

## 2022-10-26 DIAGNOSIS — J45.21 EXACERBATION OF INTERMITTENT ASTHMA, UNSPECIFIED ASTHMA SEVERITY: ICD-10-CM

## 2022-10-26 DIAGNOSIS — J45.41 MODERATE PERSISTENT ASTHMA WITH EXACERBATION: Primary | ICD-10-CM

## 2022-10-26 DIAGNOSIS — R73.03 PRE-DIABETES: ICD-10-CM

## 2022-10-26 DIAGNOSIS — J45.21 MILD INTERMITTENT ASTHMA WITH EXACERBATION: ICD-10-CM

## 2022-10-26 DIAGNOSIS — E87.6 HYPOKALEMIA: ICD-10-CM

## 2022-10-26 DIAGNOSIS — D69.6 LOW PLATELET COUNT (H): ICD-10-CM

## 2022-10-26 PROBLEM — J45.901 ASTHMA EXACERBATION ATTACKS: Status: RESOLVED | Noted: 2018-12-28 | Resolved: 2022-10-26

## 2022-10-26 PROBLEM — J45.20 INTERMITTENT ASTHMA WITHOUT COMPLICATION, UNSPECIFIED ASTHMA SEVERITY: Status: RESOLVED | Noted: 2020-02-04 | Resolved: 2022-10-26

## 2022-10-26 PROBLEM — J45.901 ACUTE ASTHMA EXACERBATION: Status: RESOLVED | Noted: 2018-11-28 | Resolved: 2022-10-26

## 2022-10-26 LAB
ANION GAP SERPL CALCULATED.3IONS-SCNC: 12 MMOL/L (ref 7–15)
BUN SERPL-MCNC: 15.4 MG/DL (ref 6–20)
CALCIUM SERPL-MCNC: 9.2 MG/DL (ref 8.6–10)
CHLORIDE SERPL-SCNC: 106 MMOL/L (ref 98–107)
CREAT SERPL-MCNC: 0.72 MG/DL (ref 0.67–1.17)
DEPRECATED HCO3 PLAS-SCNC: 23 MMOL/L (ref 22–29)
ERYTHROCYTE [DISTWIDTH] IN BLOOD BY AUTOMATED COUNT: 12.5 % (ref 10–15)
GFR SERPL CREATININE-BSD FRML MDRD: >90 ML/MIN/1.73M2
GLUCOSE SERPL-MCNC: 97 MG/DL (ref 70–99)
HBA1C MFR BLD: 6.4 % (ref 0–5.6)
HCT VFR BLD AUTO: 47.8 % (ref 40–53)
HGB BLD-MCNC: 15.9 G/DL (ref 13.3–17.7)
MCH RBC QN AUTO: 30.5 PG (ref 26.5–33)
MCHC RBC AUTO-ENTMCNC: 33.3 G/DL (ref 31.5–36.5)
MCV RBC AUTO: 92 FL (ref 78–100)
PLATELET # BLD AUTO: 135 10E3/UL (ref 150–450)
POTASSIUM SERPL-SCNC: 3.5 MMOL/L (ref 3.4–5.3)
RBC # BLD AUTO: 5.21 10E6/UL (ref 4.4–5.9)
SODIUM SERPL-SCNC: 141 MMOL/L (ref 136–145)
WBC # BLD AUTO: 6.3 10E3/UL (ref 4–11)

## 2022-10-26 PROCEDURE — 36415 COLL VENOUS BLD VENIPUNCTURE: CPT | Performed by: PHYSICIAN ASSISTANT

## 2022-10-26 PROCEDURE — 83036 HEMOGLOBIN GLYCOSYLATED A1C: CPT | Performed by: PHYSICIAN ASSISTANT

## 2022-10-26 PROCEDURE — 91312 COVID-19,PF,PFIZER BOOSTER BIVALENT: CPT | Performed by: PHYSICIAN ASSISTANT

## 2022-10-26 PROCEDURE — 85027 COMPLETE CBC AUTOMATED: CPT | Performed by: PHYSICIAN ASSISTANT

## 2022-10-26 PROCEDURE — 80048 BASIC METABOLIC PNL TOTAL CA: CPT | Performed by: PHYSICIAN ASSISTANT

## 2022-10-26 PROCEDURE — 99214 OFFICE O/P EST MOD 30 MIN: CPT | Performed by: PHYSICIAN ASSISTANT

## 2022-10-26 PROCEDURE — 0124A COVID-19,PF,PFIZER BOOSTER BIVALENT: CPT | Performed by: PHYSICIAN ASSISTANT

## 2022-10-26 RX ORDER — FLUTICASONE PROPIONATE AND SALMETEROL XINAFOATE 230; 21 UG/1; UG/1
2 AEROSOL, METERED RESPIRATORY (INHALATION) 2 TIMES DAILY
Qty: 12 G | Refills: 3 | Status: SHIPPED | OUTPATIENT
Start: 2022-10-26 | End: 2023-09-27

## 2022-10-26 RX ORDER — FLUTICASONE PROPIONATE AND SALMETEROL XINAFOATE 230; 21 UG/1; UG/1
2 AEROSOL, METERED RESPIRATORY (INHALATION) 2 TIMES DAILY
Status: CANCELLED | OUTPATIENT
Start: 2022-10-26

## 2022-10-26 RX ORDER — ALBUTEROL SULFATE 5 MG/ML
2.5 SOLUTION RESPIRATORY (INHALATION) EVERY 6 HOURS PRN
Qty: 20 ML | Refills: 0 | Status: CANCELLED | OUTPATIENT
Start: 2022-10-26

## 2022-10-26 RX ORDER — PREDNISONE 10 MG/1
40 TABLET ORAL DAILY
Qty: 20 TABLET | Refills: 0 | Status: SHIPPED | OUTPATIENT
Start: 2022-10-26 | End: 2022-10-31

## 2022-10-26 ASSESSMENT — ASTHMA QUESTIONNAIRES
QUESTION_5 LAST FOUR WEEKS HOW WOULD YOU RATE YOUR ASTHMA CONTROL: SOMEWHAT CONTROLLED
ACT_TOTALSCORE: 11
QUESTION_4 LAST FOUR WEEKS HOW OFTEN HAVE YOU USED YOUR RESCUE INHALER OR NEBULIZER MEDICATION (SUCH AS ALBUTEROL): ONE OR TWO TIMES PER DAY
QUESTION_1 LAST FOUR WEEKS HOW MUCH OF THE TIME DID YOUR ASTHMA KEEP YOU FROM GETTING AS MUCH DONE AT WORK, SCHOOL OR AT HOME: SOME OF THE TIME
QUESTION_2 LAST FOUR WEEKS HOW OFTEN HAVE YOU HAD SHORTNESS OF BREATH: MORE THAN ONCE A DAY
ACT_TOTALSCORE: 11
QUESTION_3 LAST FOUR WEEKS HOW OFTEN DID YOUR ASTHMA SYMPTOMS (WHEEZING, COUGHING, SHORTNESS OF BREATH, CHEST TIGHTNESS OR PAIN) WAKE YOU UP AT NIGHT OR EARLIER THAN USUAL IN THE MORNING: TWO OR THREE NIGHTS A WEEK

## 2022-10-26 NOTE — PROGRESS NOTES
Subjective:    Mary Arguello is a 51 year old male who presents with chief complaint of chronic nausea.  He says he has had constant nausea for 2 months.  Worse when he lays down at night.  He says sometimes the nausea wakes him up when he is sleeping.  He also describes burning in his chest.  He says that his albuterol inhaler does help his symptoms of nausea.  Initially he told me he was taking his controller medication.  However, with further discussion he says he has been out of his Advair for about 2 months.  Appetite is normal.  He is not taking omeprazole right now.    Patient Active Problem List   Diagnosis     Anaphylaxis     Hiatal Hernia     Chest Pain     Nonspecific reaction to tuberculin skin test without active tuberculosis     Left Ventricular Hypertrophy     Diastolic Dysfunction     Primary Vitiligo     Fibrothorax     Pre-diabetes     Tachycardia     Dyspnea     Hypokalemia     Moderate asthma with exacerbation, unspecified whether persistent     Aspiration pneumonitis (H)     Low platelet count (H)       Current Outpatient Medications:      albuterol (PROVENTIL) (2.5 MG/3ML) 0.083% neb solution, NEBULIZE 1 VIAL EVERY 4 HOURS AS NEEDED FOR WHEEZING., Disp: 90 mL, Rfl: 2     fluticasone-salmeterol (ADVAIR-HFA) 230-21 MCG/ACT inhaler, Inhale 2 puffs into the lungs 2 times daily, Disp: 12 g, Rfl: 3     montelukast (SINGULAIR) 10 mg tablet, [MONTELUKAST (SINGULAIR) 10 MG TABLET] Take 1 tablet (10 mg total) by mouth at bedtime., Disp: 60 tablet, Rfl: 3     omeprazole (PRILOSEC) 20 MG DR capsule, TAKE 1 CAPSULE 30 MINUTES BEFORE FIRST MEAL DAILY FOR STOMACH // 1 HNUB NOJ 1 LUB UA NTEJ NOJ TSHAIS PAB ALE MOB PLAB/MOB NCAUJ PLAB, Disp: 90 capsule, Rfl: 3     predniSONE (DELTASONE) 10 MG tablet, Take 4 tablets (40 mg) by mouth daily for 5 days, Disp: 20 tablet, Rfl: 0      Objective:   Allergies:  Patient has no known allergies.    Vitals:  Vitals:    10/26/22 0746   BP: 124/78   BP Location: Left arm  "  Patient Position: Sitting   Cuff Size: Adult Regular   Pulse: 64   Resp: 16   Temp: 97.2  F (36.2  C)   TempSrc: Temporal   SpO2: 99%   Weight: 58.1 kg (128 lb)   Height: 1.468 m (4' 9.8\")       Body mass index is 26.94 kg/m .    Vital signs reviewed.  General: Patient is alert and oriented x 3, in no apparent distress  Cardiac: regular rate and rhythm, no murmurs  Pulmonary: lungs have mild expiratory wheezes bilaterally, no crackles rales or rhonchi   Abdomen: Non tender to palpation, no hepatosplenomegaly, negative Alvarado's sign, no pain over McBurney's point, positive bowel sounds, no masses palpable      Results for orders placed or performed in visit on 10/26/22   CBC with platelets     Status: Abnormal   Result Value Ref Range    WBC Count 6.3 4.0 - 11.0 10e3/uL    RBC Count 5.21 4.40 - 5.90 10e6/uL    Hemoglobin 15.9 13.3 - 17.7 g/dL    Hematocrit 47.8 40.0 - 53.0 %    MCV 92 78 - 100 fL    MCH 30.5 26.5 - 33.0 pg    MCHC 33.3 31.5 - 36.5 g/dL    RDW 12.5 10.0 - 15.0 %    Platelet Count 135 (L) 150 - 450 10e3/uL   Hemoglobin A1c     Status: Abnormal   Result Value Ref Range    Hemoglobin A1C 6.4 (H) 0.0 - 5.6 %     Other labs pending.    Assessment and Plan:   1. Moderate persistent asthma with exacerbation  He has been out of his controller medicine for 1 to 2 months.  Had some wheezing on exam today.  Restart Advair.  Prescription sent for prednisone on her own as well.  We discussed this.  He knows to let us know if any symptoms are worsening or other concerns.  Given the timing for stopping this medicine, and exam findings, I think asthma flareup could be at least part of the cause of his nausea.  See #2.  - fluticasone-salmeterol (ADVAIR-HFA) 230-21 MCG/ACT inhaler; Inhale 2 puffs into the lungs 2 times daily  Dispense: 12 g; Refill: 3  - predniSONE (DELTASONE) 10 MG tablet; Take 4 tablets (40 mg) by mouth daily for 5 days  Dispense: 20 tablet; Refill: 0    2. Chronic nausea  Aside from wheezing on " pulmonary exam, remainder of exam is normal.  He does not have an acute abdomen.  I think the nausea is related to his poor asthma control and/or GERD symptoms.  He is not taking omeprazole, does not want a restart yet.    He wants to restart his inhalers and monitor.  Can consider restarting omeprazole in the future.  He knows to contact us if any symptoms are worsening.  Exam and vital signs reassuring today.  - CBC with platelets  - Basic metabolic panel  - Hemoglobin A1c    3. Pre-diabetes  History of prediabetes, continues to have mildly elevated A1c.  Check annually.  Continue to encourage healthy eating and exercise.    4. Low platelet count (H)  Screening labs today showed slightly low platelet count.  He does not appear to have had this in the past.  No acute concerns.  Plan to recheck this at a future visit.    5. Hypokalemia  Past history of hypokalemia.  Has had mildly low potassium the past 2 times of checked.  Not taking any obvious medicines that could cause this.  I will follow-up with today's lab results.  - Basic metabolic panel    7.  Healthcare maintenance  COVID booster given today.  We will address colon cancer screening at a future visit.      30 minutes spent on the date of the encounter doing chart review, history and exam, and documentation.         This dictation uses voice recognition software, which may contain typographical errors.

## 2022-10-27 DIAGNOSIS — J45.21 MILD INTERMITTENT ASTHMA WITH ACUTE EXACERBATION: ICD-10-CM

## 2022-10-27 DIAGNOSIS — Z76.0 ENCOUNTER FOR MEDICATION REFILL: Primary | ICD-10-CM

## 2022-10-27 RX ORDER — ALBUTEROL SULFATE 0.83 MG/ML
SOLUTION RESPIRATORY (INHALATION)
Qty: 90 ML | Refills: 11 | Status: SHIPPED | OUTPATIENT
Start: 2022-10-27 | End: 2023-09-27

## 2022-11-07 ENCOUNTER — TELEPHONE (OUTPATIENT)
Dept: FAMILY MEDICINE | Facility: CLINIC | Age: 52
End: 2022-11-07

## 2022-11-07 NOTE — LETTER
November 16, 2022      Mary Beaver[address]  [place] [address]        Dear ,    We are writing to inform you of your test results.    Normal blood cell counts.   blood sugars remain higher than normal.  Keep working on healthy eating and exercise.    Resulted Orders   CBC with platelets   Result Value Ref Range    WBC Count 6.3 4.0 - 11.0 10e3/uL    RBC Count 5.21 4.40 - 5.90 10e6/uL    Hemoglobin 15.9 13.3 - 17.7 g/dL    Hematocrit 47.8 40.0 - 53.0 %    MCV 92 78 - 100 fL    MCH 30.5 26.5 - 33.0 pg    MCHC 33.3 31.5 - 36.5 g/dL    RDW 12.5 10.0 - 15.0 %    Platelet Count 135 (L) 150 - 450 10e3/uL   Basic metabolic panel   Result Value Ref Range    Sodium 141 136 - 145 mmol/L    Potassium 3.5 3.4 - 5.3 mmol/L    Chloride 106 98 - 107 mmol/L    Carbon Dioxide (CO2) 23 22 - 29 mmol/L    Anion Gap 12 7 - 15 mmol/L    Urea Nitrogen 15.4 6.0 - 20.0 mg/dL    Creatinine 0.72 0.67 - 1.17 mg/dL    Calcium 9.2 8.6 - 10.0 mg/dL    Glucose 97 70 - 99 mg/dL    GFR Estimate >90 >60 mL/min/1.73m2      Comment:      Effective December 21, 2021 eGFRcr in adults is calculated using the 2021 CKD-EPI creatinine equation which includes age and gender (Nancy et al., NEJ, DOI: 10.1056/KDBNmh9040109)   Hemoglobin A1c   Result Value Ref Range    Hemoglobin A1C 6.4 (H) 0.0 - 5.6 %      Comment:      Normal <5.7%   Prediabetes 5.7-6.4%    Diabetes 6.5% or higher     Note: Adopted from ADA consensus guidelines.       If you have any questions or concerns, please call the clinic at the number listed above.       Sincerely,

## 2022-11-07 NOTE — TELEPHONE ENCOUNTER
----- Message from Tri Prather PA-C sent at 11/6/2022  9:12 PM CST -----  His kidney tests are normal.  Normal blood cell counts.  His blood sugars remain higher than normal.  Keep working on healthy eating and exercise.

## 2022-11-10 NOTE — TELEPHONE ENCOUNTER
Unable to Leave message x 2. Please review message thread below and advise the patient as indicated. Please schedule if necessary or indicated in message thread.

## 2023-09-27 ENCOUNTER — OFFICE VISIT (OUTPATIENT)
Dept: FAMILY MEDICINE | Facility: CLINIC | Age: 53
End: 2023-09-27
Payer: COMMERCIAL

## 2023-09-27 ENCOUNTER — OFFICE VISIT (OUTPATIENT)
Dept: PEDIATRICS | Facility: CLINIC | Age: 53
End: 2023-09-27
Payer: COMMERCIAL

## 2023-09-27 ENCOUNTER — HOSPITAL ENCOUNTER (OUTPATIENT)
Dept: GENERAL RADIOLOGY | Facility: HOSPITAL | Age: 53
Discharge: HOME | End: 2023-09-27
Attending: INTERNAL MEDICINE | Admitting: INTERNAL MEDICINE
Payer: COMMERCIAL

## 2023-09-27 VITALS
OXYGEN SATURATION: 96 % | RESPIRATION RATE: 16 BRPM | DIASTOLIC BLOOD PRESSURE: 78 MMHG | HEART RATE: 87 BPM | SYSTOLIC BLOOD PRESSURE: 120 MMHG | TEMPERATURE: 98.1 F | WEIGHT: 129.1 LBS | BODY MASS INDEX: 27.1 KG/M2

## 2023-09-27 VITALS
HEIGHT: 60 IN | TEMPERATURE: 98.4 F | BODY MASS INDEX: 25.35 KG/M2 | RESPIRATION RATE: 16 BRPM | HEART RATE: 85 BPM | DIASTOLIC BLOOD PRESSURE: 70 MMHG | WEIGHT: 129.12 LBS | OXYGEN SATURATION: 96 % | SYSTOLIC BLOOD PRESSURE: 106 MMHG

## 2023-09-27 DIAGNOSIS — J45.21 MILD INTERMITTENT ASTHMA WITH ACUTE EXACERBATION: Primary | ICD-10-CM

## 2023-09-27 DIAGNOSIS — R06.02 SHORTNESS OF BREATH: ICD-10-CM

## 2023-09-27 DIAGNOSIS — R05.1 ACUTE COUGH: ICD-10-CM

## 2023-09-27 DIAGNOSIS — Z86.11 HISTORY OF TUBERCULOSIS: ICD-10-CM

## 2023-09-27 DIAGNOSIS — Z76.0 ENCOUNTER FOR MEDICATION REFILL: ICD-10-CM

## 2023-09-27 LAB
BASOPHILS # BLD AUTO: 0.1 10E3/UL (ref 0–0.2)
BASOPHILS NFR BLD AUTO: 1 %
EOSINOPHIL # BLD AUTO: 0.9 10E3/UL (ref 0–0.7)
EOSINOPHIL NFR BLD AUTO: 12 %
ERYTHROCYTE [DISTWIDTH] IN BLOOD BY AUTOMATED COUNT: 12.6 % (ref 10–15)
HCT VFR BLD AUTO: 48.7 % (ref 40–53)
HGB BLD-MCNC: 16.2 G/DL (ref 13.3–17.7)
IMM GRANULOCYTES # BLD: 0 10E3/UL
IMM GRANULOCYTES NFR BLD: 0 %
LYMPHOCYTES # BLD AUTO: 1.2 10E3/UL (ref 0.8–5.3)
LYMPHOCYTES NFR BLD AUTO: 15 %
MCH RBC QN AUTO: 30.9 PG (ref 26.5–33)
MCHC RBC AUTO-ENTMCNC: 33.3 G/DL (ref 31.5–36.5)
MCV RBC AUTO: 93 FL (ref 78–100)
MONOCYTES # BLD AUTO: 0.7 10E3/UL (ref 0–1.3)
MONOCYTES NFR BLD AUTO: 8 %
NEUTROPHILS # BLD AUTO: 5 10E3/UL (ref 1.6–8.3)
NEUTROPHILS NFR BLD AUTO: 64 %
PLATELET # BLD AUTO: 171 10E3/UL (ref 150–450)
RBC # BLD AUTO: 5.25 10E6/UL (ref 4.4–5.9)
WBC # BLD AUTO: 7.8 10E3/UL (ref 4–11)

## 2023-09-27 PROCEDURE — 99214 OFFICE O/P EST MOD 30 MIN: CPT | Mod: 25 | Performed by: INTERNAL MEDICINE

## 2023-09-27 PROCEDURE — 85025 COMPLETE CBC W/AUTO DIFF WBC: CPT | Performed by: FAMILY MEDICINE

## 2023-09-27 PROCEDURE — 94640 AIRWAY INHALATION TREATMENT: CPT | Performed by: INTERNAL MEDICINE

## 2023-09-27 PROCEDURE — 80048 BASIC METABOLIC PNL TOTAL CA: CPT | Performed by: FAMILY MEDICINE

## 2023-09-27 PROCEDURE — 99207 REFERRAL TO ACUTE AND DIAGNOSTIC SERVICES: CPT | Performed by: FAMILY MEDICINE

## 2023-09-27 PROCEDURE — 71046 X-RAY EXAM CHEST 2 VIEWS: CPT

## 2023-09-27 PROCEDURE — 36415 COLL VENOUS BLD VENIPUNCTURE: CPT | Performed by: FAMILY MEDICINE

## 2023-09-27 PROCEDURE — 96374 THER/PROPH/DIAG INJ IV PUSH: CPT | Performed by: INTERNAL MEDICINE

## 2023-09-27 RX ORDER — ALBUTEROL SULFATE 0.83 MG/ML
2.5 SOLUTION RESPIRATORY (INHALATION) ONCE
Status: COMPLETED | OUTPATIENT
Start: 2023-09-27 | End: 2023-09-27

## 2023-09-27 RX ORDER — ALBUTEROL SULFATE 0.83 MG/ML
SOLUTION RESPIRATORY (INHALATION)
Qty: 90 ML | Refills: 11 | Status: SHIPPED | OUTPATIENT
Start: 2023-09-27

## 2023-09-27 RX ORDER — METHYLPREDNISOLONE 4 MG
TABLET, DOSE PACK ORAL
Qty: 21 TABLET | Refills: 0 | Status: SHIPPED | OUTPATIENT
Start: 2023-09-27 | End: 2023-12-18

## 2023-09-27 RX ORDER — METHYLPREDNISOLONE SODIUM SUCCINATE 125 MG/2ML
62.5 INJECTION, POWDER, LYOPHILIZED, FOR SOLUTION INTRAMUSCULAR; INTRAVENOUS ONCE
Status: COMPLETED | OUTPATIENT
Start: 2023-09-27 | End: 2023-09-27

## 2023-09-27 RX ORDER — FLUTICASONE PROPIONATE AND SALMETEROL 250; 50 UG/1; UG/1
1 POWDER RESPIRATORY (INHALATION) 2 TIMES DAILY
Qty: 1 EACH | Refills: 1 | Status: SHIPPED | OUTPATIENT
Start: 2023-09-27 | End: 2023-12-18

## 2023-09-27 RX ADMIN — ALBUTEROL SULFATE 2.5 MG: 0.83 SOLUTION RESPIRATORY (INHALATION) at 16:28

## 2023-09-27 RX ADMIN — METHYLPREDNISOLONE SODIUM SUCCINATE 62.5 MG: 125 INJECTION INTRAMUSCULAR; INTRAVENOUS at 16:56

## 2023-09-27 ASSESSMENT — PATIENT HEALTH QUESTIONNAIRE - PHQ9
SUM OF ALL RESPONSES TO PHQ QUESTIONS 1-9: 21
SUM OF ALL RESPONSES TO PHQ QUESTIONS 1-9: 21
10. IF YOU CHECKED OFF ANY PROBLEMS, HOW DIFFICULT HAVE THESE PROBLEMS MADE IT FOR YOU TO DO YOUR WORK, TAKE CARE OF THINGS AT HOME, OR GET ALONG WITH OTHER PEOPLE: EXTREMELY DIFFICULT

## 2023-09-27 ASSESSMENT — ASTHMA QUESTIONNAIRES: ACT_TOTALSCORE: 19

## 2023-09-27 ASSESSMENT — PAIN SCALES - GENERAL: PAINLEVEL: NO PAIN (0)

## 2023-09-27 NOTE — TELEPHONE ENCOUNTER
CHIEF COMPLAINT  Chief Complaint   Patient presents with   • Follow-up   • Office Visit     SUBJECTIVE     HISTORY OF PRESENT ILLNESS    74-year-old female the past medical history significant for postoperative hypothyroidism.  Roughly 14 years ago, the patient states that she had a history of a thyroid nodule and underwent a total thyroidectomy.  Postoperative pathology came back negative for malignancy.  She has been maintained on full weight-based replacement dosing since then. She is doing well currently on levothyroxine 137 mcg daily.  Her most recent thyroid function studies are at goal.  She denies any symptoms of overt hyperthyroidism.    REVIEW OF SYSTEMS    Review of Systems   Constitutional: Negative for appetite change, fatigue and unexpected weight change.   HENT: Negative for facial swelling, sore throat, trouble swallowing and voice change.    Eyes: Negative for visual disturbance.   Respiratory: Negative for cough, shortness of breath and wheezing.    Cardiovascular: Negative for chest pain, palpitations and leg swelling.   Gastrointestinal: Negative for abdominal pain, constipation, diarrhea, nausea and vomiting.   Endocrine: Negative for cold intolerance, heat intolerance, polydipsia, polyphagia and polyuria.   Genitourinary: Negative for dysuria and frequency.   Musculoskeletal: Negative for back pain and joint swelling.   Skin: Negative for wound.   Neurological: Negative for dizziness, tremors, weakness, light-headedness and numbness.   Psychiatric/Behavioral: Negative for decreased concentration, dysphoric mood and sleep disturbance. The patient is not nervous/anxious.         Past history reviewed:   Past Medical History:   Diagnosis Date   • Age related cataract    • Arthritis    • Bell's palsy     OS   • Cervical spondylosis    • Colon polyps    • DDD (degenerative disc disease), lumbar    • Dry eye syndrome    • Essential (primary) hypertension    • Family history of colon cancer    •  Central PA team  [phone]  Pool: HE PA MED ([id number])          PA has been initiated.       PA form completed and faxed insurance via Cover My Meds     Key:   [id number] - Rx #: [id number]     Medication:  Symbicort 160-4.5MCG/ACT IN AERO    Insurance:  [employer] [place]        Response will be received via fax and may take up to 5-10 business days depending on plan         Glaucoma suspect    • High cholesterol    • Hypothyroidism    • Morbid obesity (CMD)    • Sjogren's syndrome (CMD)    • Spinal stenosis    • Spinal stenosis, lumbar region with neurogenic claudication    • Spondylolisthesis, lumbar region    • Urinary incontinence      Past Surgical History:   Procedure Laterality Date   • Breast surgery  1990s    biopsy- benign   • Colonoscopy     • Joint replacement Left 2017    Hip replacement   • Thyroidectomy     • Total knee arthroplasty Bilateral 2011    Dr. Phelan     Family History   Problem Relation Age of Onset   • Dementia/Alzheimers Mother    • Cancer, Colon Father    • Cataracts Father    • Retinal detachment Father         Social history reviewed and updated with patient.     Allergies:   Allergies   Allergen Reactions   • Lisinopril-Hydrochlorothiazide Cough   • Zestoretic Cough     Unknown        Medications:   Current Outpatient Medications   Medication Instructions   • amLODIPine (NORVASC) 5 mg, Oral, DAILY   • atenolol (TENORMIN) 50 MG tablet TAKE 1 TABLET BY MOUTH DAILY   • cephalexin (KEFLEX) 500 MG capsule TAKE 1 CAPSULE BY MOUTH TWICE A DAY FOR 7 DAYS   • diazePAM (VALIUM) 5 MG tablet No dose, route, or frequency recorded.   • diazePAM (VALIUM) 5 mg, Oral   • diclofenac (VOLTAREN) 75 mg, Oral, 2 TIMES DAILY   • docusate sodium (COLACE) 100 mg, Oral, 2 TIMES DAILY   • etodolac (LODINE) 500 mg, Oral, 2 TIMES DAILY PRN   • famotidine (PEPCID) 40 mg, Oral, DAILY   • furosemide (LASIX) 20 mg, Oral, DAILY   • hydroxychloroquine (PLAQUENIL) 200 MG tablet 1 tablet, Oral, DAILY   • hydroxychloroquine (PLAQUENIL) 200 mg, Oral, DAILY   • levothyroxine 137 mcg, Oral, DAILY   • methylPREDNISolone (MEDROL DOSEPAK) 4 mg, Oral, SEE ADMIN INSTRUCTIONS, follow package directions   • nirmatrelvir & ritonavir (Paxlovid) 20 x 150 MG & 10 x 100MG Tablet Therapy Pack Take 300 mg nirmatrelvir (two 150 mg tablets) with 100 mg ritonavir (one 100 mg tablet), with all three tablets  taken together by mouth twice daily for 5 days. Hold simvastatin for 10 days   • ofloxacin (OCUFLOX) 0.3 % ophthalmic solution INT 1 GTT AEY D   • prednisoLONE acetate (PRED FORTE) 1 % ophthalmic suspension RALPH 1 GTT AEY D   • Restasis 0.05 % ophthalmic emulsion No dose, route, or frequency recorded.   • simvastatin (ZOCOR) 20 MG tablet TAKE 1 TABLET BY MOUTH DAILY   • Tetrahydrozoline-Zn Sulfate (RELIEF DROPS OP) Ophthalmic   • traMADol (ULTRAM) 50 mg, Oral, EVERY 4 TO 6 HOURS PRN        OBJECTIVE     Vitals:    09/28/23 1048   BP: 138/83   BP Location: RUE - Right upper extremity   Patient Position: Sitting   Pulse: 83   Temp: 98 °F (36.7 °C)   TempSrc: Temporal        Physical exam:  Physical Exam  Vitals reviewed.   Constitutional:       Appearance: Normal appearance.   HENT:      Head: Normocephalic.      Mouth/Throat:      Mouth: Mucous membranes are moist.   Eyes:      Extraocular Movements: Extraocular movements intact.   Cardiovascular:      Rate and Rhythm: Normal rate and regular rhythm.      Pulses: Normal pulses.      Heart sounds: Normal heart sounds.   Pulmonary:      Effort: Pulmonary effort is normal.      Breath sounds: Normal breath sounds.   Abdominal:      General: Abdomen is flat. Bowel sounds are normal.      Palpations: Abdomen is soft.   Musculoskeletal:         General: Normal range of motion.      Cervical back: Normal range of motion.   Skin:     General: Skin is warm and dry.   Neurological:      General: No focal deficit present.      Mental Status: She is alert and oriented to person, place, and time. Mental status is at baseline.   Psychiatric:         Mood and Affect: Mood normal.         Behavior: Behavior normal.         Thought Content: Thought content normal.         Judgment: Judgment normal.          Labs   Latest Reference Range & Units 06/03/22 10:31 03/15/23 14:14 09/14/23 13:26   T4, FREE 0.75 - 2.00 ng/dL 1.3 1.57 (E) 1.62 (E)   TSH 0.450 - 5.500 uIU/mL 1.361 3.715 (E)  2.503 (E)   (E): External lab result      ASSESSMENT and PLAN     Diagnoses and associated orders for this visit:  1. Postoperative hypothyroidism  -     Thyroid Stimulating Hormone  -     Free T4  -     Levothyroxine Sodium  2. Essential hypertension     Hypothyroidism: The patient underwent total thyroidectomy in the past and pathology came back negative for malignancy.  She is currently maintained on levothyroxine 137 mcg daily.  She is doing well on this dose, she denies any symptoms of overt hyperthyroidism.  Thyroid function studies are at goal.  At this time, I would like her to continue her current medication regimen without change.  I will see her back in the office in 12 months with repeat labs prior to the visit.    Hypertension: Patient's blood pressure is well controlled on current medication regimen.  Continue current medication regimen without change.    Follow-up in 12 months with repeat labs prior to the visit.    Dr. Teddy Cristobal DO Endocrinology  [facility]   [address]/[place] [address]  Office: [phone]  Fax: [phone]    Note to Patient: The 21st Century Cures Act makes medical notes like these available to patients in the interest of transparency. However, be advised this is a medical document. It is intended as peer to peer communication. It is written in medical language and may contain abbreviations or verbiage that are unfamiliar. It may appear blunt or direct. Medical documents are intended to carry relevant information, facts as evident, and the clinical opinion of the practitioner.  This note may have been transcribed using a voice dictation system. Voice recognition errors may occur. This should not be taken to alter the content or meaning of this note.

## 2023-09-27 NOTE — PATIENT INSTRUCTIONS
PLAN:  1.  Fill prescription and start medrol dosepack.  2.  Use Advair one puff twice daily when tapering off the dosepack  3.  Use albuterol nebs (new prescription has been sent) or your albuterol inhaler as needed up to 4-6 times daily for shortness of breath or chest tightness.   You may decrease this frequency when feeling better.  4.  Contact your regular MD if not improving.

## 2023-09-27 NOTE — PROGRESS NOTES
ASSESSMENT:      1.  Asthma exacerbation.   Patient appears to have mild intermittent asthma with acute exacerbation.    Patient feeling at least 50% better after neb, which seemed to work better than what he did at home.  2.  Chronic changes on xray, unchanged from previous.    History of TB in the 1980s, treated in [place].  Nothing to suggest acute issue or change.        PLAN:  1.  Patient given IV solumedrol at the [facility] and discharged on medrol dosepack  2.  Use Advair one puff twice daily when tapering off the dosepack  3.  Use albuterol nebs or albuterol inhaler as needed up to 4-6 times daily for shortness of breath or chest tightness, wean down when able  4.  Contact regular MD if not improving.     Phillip Hernandez is a 52 year old, presenting for the following health issues:  Asthma and Shortness of Breath        9/27/2023     2:24 PM   Additional Questions   Roomed by Debbie HO       Shortness of Breath       Shortness of Breath/Breathing Problem  Onset/Duration: 09/24/2023  Progression of symptoms: worsening  Accompanying signs and symptoms:       SOB at rest: YES       SOB with activity: YES- Patient stated cannot do activity without SOB       Pain with inspiration: Pt stated chest can feel tight and would need to sit down, and slowly breathe.  Not painful.       Cough: some, chest feels very tight       Pink tinged sputum: No sputum.       Sweating: No       Nausea/vomiting: YES       Lightheadedness: YES       Palpitations: No       Fever/chills: No       Heartburn: YES, taking PPI  History   Family history of coagulation disorders: No  Tobacco use: No  Previous similar symptoms: no   Precipitating factors:  Related to eating: No  Better with burping: YES  Therapies tried and outcome: Inhaler and Nebulizer     Patient hasn't had an asthma exacerbation for a year or two.   Doesn't use his inhalers regularly.  Has also been off singulair.  Did start using MDI and nebs past couple of days.   Progressive dyspnea, with significant dyspnea on exertion.     No recent infections.  No travel.   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  No farm or bird exposure.      Symptoms worse at noc, not sleeping well.   Presented to clinic today.    Review of Systems   Respiratory:  Positive for shortness of breath.    No ENT symptoms   Mild heartburn symptoms despite PPI  otherwise negative              Objective    /78 (BP Location: Right arm, Patient Position: Sitting, Cuff Size: Adult Regular)   Pulse 87   Temp 98.1  F (36.7  C) (Oral)   Resp 16   SpO2 96%   There is no height or weight on file to calculate BMI.  Physical Exam   GENERAL: healthy, alert and no distress, not using accessory muscles   NECK: no adenopathy, no asymmetry, masses, or scars and thyroid normal to palpation  RESP:  mostly diffuse rhonchi with scattered rales, only a few wheezes heard.   No painful respirations.  CV: regular rate and rhythm, normal S1 S2, no S3 or S4, no murmur, click or rub, no peripheral edema  ABDOMEN: soft, nontender  MS: no gross musculoskeletal defects noted, no edema    Patient given nebulization --> felt much better     Results for orders placed or performed in visit on 09/27/23 (from the past 24 hour(s))   CBC with platelets and differential    Narrative    The following orders were created for panel order CBC with platelets and differential.  Procedure                               Abnormality         Status                     ---------                               -----------         ------                     CBC with platelets and d...[[id number]]  Abnormal            Final result                 Please view results for these tests on the individual orders.   CBC with platelets and differential   Result Value Ref Range    WBC Count 7.8 4.0 - 11.0 10e3/uL    RBC Count 5.25 4.40 - 5.90 10e6/uL    Hemoglobin 16.2 13.3 - 17.7 g/dL    Hematocrit 48.7 40.0 - 53.0 %    MCV 93 78 - 100 fL     MCH 30.9 26.5 - 33.0 pg    MCHC 33.3 31.5 - 36.5 g/dL    RDW 12.6 10.0 - 15.0 %    Platelet Count 171 150 - 450 10e3/uL    % Neutrophils 64 %    % Lymphocytes 15 %    % Monocytes 8 %    % Eosinophils 12 %    % Basophils 1 %    % Immature Granulocytes 0 %    Absolute Neutrophils 5.0 1.6 - 8.3 10e3/uL    Absolute Lymphocytes 1.2 0.8 - 5.3 10e3/uL    Absolute Monocytes 0.7 0.0 - 1.3 10e3/uL    Absolute Eosinophils 0.9 (H) 0.0 - 0.7 10e3/uL    Absolute Basophils 0.1 0.0 - 0.2 10e3/uL    Absolute Immature Granulocytes 0.0 <=0.4 10e3/uL

## 2023-09-27 NOTE — COMMUNITY RESOURCES LIST (ENGLISH)
Immunization  Immunization performed in RD by Dorothy Rich MA. Patient tolerated the procedure well without complications.  01/09/23   Dorothy Rich MA    09/27/2023   [facility] [employer]  N/A  For questions about this resource list or additional care needs, please contact your primary care clinic or care manager.  Phone: [phone]   Email: N/A   Address: [address] [place] [address]   Hours: N/A        Financial Stability       Rent and mortgage payment assistance  1  [facility] - Out[facility] Distance: 1.58 miles      In-Person, Phone/Virtual   [address] [facility] [address] [place] [address]  Language: English, [place]  Hours: Mon - Fri 8:00 AM - 4:30 PM  Fees: Insurance   Phone: (920) 578-9584 Email: roots@[employer] Website: https://[employer]/roots/     2  [facility] [employer] - Multifamily Rental Assistance Program Distance: 1.6 miles      Phone/Virtual   [address] [facility] [address] [place] [address]  Language: English  Hours: Mon - Fri 8:00 AM - 5:00 PM Appt. Only  Fees: Free   Phone: (775) 397-5236 Email: [place].[employer]@[facility] Website: https://www.[facility].gov/index.html          Food and Nutrition       Food pantry  3  [facility] Distance: 0.87 miles      Delivery, [facility]   [address] [place] [address]  Language: English, [place]  Hours: Mon 9:00 AM - 6:00 PM Appt. Only, Tue - Fri 9:00 AM - 5:00 PM Appt. Only  Fees: Free   Phone: (767) 567-4338 Email: info@[employer].org Website: http://www.[employer].org/site     4  [facility] Distance: 1.62 miles      [facility]   [address] [place] [address]  Language: English, Hmong, Jony, [place]  Hours: Mon 11:00 AM - 3:00 PM , Wed 11:00 AM - 3:00 PM , Fri 11:00 AM - 3:00 PM  Fees: Free, Self Pay   Phone: (693) 627-3632 Email: Claude@[facility].org Website: http://[facility] Hospital[facility]orth.org/community/un-sztc-i-[facility]/     SNAP application assistance  5  [employer] [place] Distance: 0.83 miles      Phone/Virtual   [address] [place] [address]  Language: English, Hmong, Macanese, Eritrean, Finnish  Hours: Mon - Fri 8:30 AM - 4:30 PM  Fees: Free   Phone: (854) 402-3292 Email: helpline@[employer].org Website: https://www.[employer]ers[employer].org/programs/[place]-food-helpline/     6  [place] [employer] - [employer] (SNAP) Distance: 1.59 miles      Phone/Virtual   [address] [address] [place] [address]  Language: English, Hmong, Macanese, Eritrean, Finnish, Slovak  Hours: Mon - Fri 9:00 AM - 5:00 PM  Fees: Free   Phone: (614) 226-2266 Website: https://[place].gov/dhs/people-we-serve/adults/economic-assistance/food-nutrition/programs-and-services/supplemental-nutrition-assistance-program.jsp     Soup kitchen or free meals  7  [facility] [place] [facility] - [employer] Distance: 1.33 miles      In-Person   [address] [place] [address]  Language: English  Hours: Mon - Sun 4:30 PM - 5:30 PM  Fees: Free   Phone: (590) 520-9977 Email: info@[employer] Website: https://www.[employer].org/locations/[facility]/     8  [facility] [place] [facility] - Free Summer Meals Distance: 1.51 miles      In-Person   [address] [place] [address]  Language: English, Hmong, Finnish  Hours: Mon - Fri 12:00 PM - 1:00 PM , Mon - Fri 3:00 PM - 4:00 PM  Fees: Free   Phone: (792) 132-5961 Email: Nico@.[facility].mn. Website: https://www.[facility]/departments/thao-recreation/[facility]          Important Numbers & Websites       Emergency Services   911  City Services   311  Poison Control   (810) 873-2792  Suicide Prevention Lifeline   (233) 104-6699 (TALK)  Child Abuse Hotline   (771) 700-4747 (4-A-Child)  Sexual Assault Hotline   (450) 664-2104 (HOPE)  National Runaway Safeline   (801) 110-3910 (RUNAWAY)  All-Options Talkline   (940) 106-4912  Substance Abuse Referral   (522) 584-1894 (HELP)

## 2023-09-27 NOTE — Clinical Note
Thanks for the referral to the [facility].  You gave us our first referral !! Mary felt much better after only one neb.   His chest x-ray is markedly abnormal (history of TB) but is unchanged.   WBC is normal and we gave him steroids and new nebs, as well as restarting his Advair.  Thanks

## 2023-09-27 NOTE — PROGRESS NOTES
"  Assessment & Plan     Mild intermittent asthma with acute exacerbation  I do not see any obvious signs of pneumonia or infection (no fever or elevated WBC), however, this must be ruled out. He needs nebulizers and a steroid burst at the very least. Since the acute and diagnostic center is open, I'll send him there (I do not even have xray available).  - CBC with platelets and differential  - Basic metabolic panel  (Ca, Cl, CO2, Creat, Gluc, K, Na, BUN)  - CBC with platelets and differential  - Basic metabolic panel  (Ca, Cl, CO2, Creat, Gluc, K, Na, BUN)  - Referral to Acute and Diagnostic Services (Day of diagnostic / First order acute)      Review of external notes as documented elsewhere in note  Ordering of each unique test  Prescription drug management  30 minutes spent by me on the date of the encounter doing chart review, history and exam, documentation and further activities per the note     BMI:   Estimated body mass index is 27.1 kg/m  as calculated from the following:    Height as of this encounter: 1.47 m (4' 9.87\").    Weight as of this encounter: 58.6 kg (129 lb 1.9 oz).       Depression Screening Follow Up        9/27/2023     2:11 PM   PHQ   PHQ-9 Total Score 21   Q9: Thoughts of better off dead/self-harm past 2 weeks Nearly every day   F/U: Thoughts of suicide or self-harm No   F/U: Safety concerns No            No data to display                  Follow Up  Follow Up Actions Taken  Referred patient back to mental health provider    Discussed the following ways the patient can remain in a safe environment:  be around others    Anne Guy MD  [facility]    Referral to Acute and Diagnostic Services    [phone] ([place] [address]  [address]    Transition to Acute & Diagnostic Services Clinic has been discussed with patient, and he agrees with next level of care.   Patient understands that evaluation/treatment at [facility] " "typically takes significantly longer than in clinic/urgent care (>2 hours).  The [facility] Acute and Diagnostics Services Clinic has been contacted by provider/staff to confirm patient acceptance.         Special issues:  he speaks good English                       The following provider has assessed this patient for intervention at [facility], and directed the patient for referral: leobardo Owen                Phillip Hernandez is a 52 year old, presenting for the following health issues:  Asthma      9/27/2023     2:24 PM   Additional Questions   Roomed by vin schwartz       History of Present Illness     Asthma:  He presents for follow up of asthma.  He has some cough, some wheezing, and some shortness of breath.  He is using a relief medication every 4 hours. He typically misses taking his controller medication 4 time(s) per week. Patient is aware of the following triggers: none. The patient has not had a visit to the Emergency Room, Urgent Care or Hospital due to asthma since the last clinic visit.     He eats 0-1 servings of fruits and vegetables daily.He consumes 1 sweetened beverage(s) daily.He exercises with enough effort to increase his heart rate 9 or less minutes per day.  He exercises with enough effort to increase his heart rate 3 or less days per week.   He is not taking prescribed medications regularly due to side effects and remembering to take.    Last 3 days - asthma was bad  Could not sleep  Had a tight chest on and off  Used inhaler - did not help; nebulizer helped. Needs it constantly (every 20 min)  No fever  Worse with walking.    Review of Systems       Objective    /70   Pulse 85   Temp 98.4  F (36.9  C) (Oral)   Resp 16   Ht 1.47 m (4' 9.87\")   Wt 58.6 kg (129 lb 1.9 oz)   SpO2 96%   BMI 27.10 kg/m    Body mass index is 27.1 kg/m .  Physical Exam   GENERAL: alert and over weight  NECK: no adenopathy, no asymmetry, masses, or scars and thyroid normal to palpation  RESP: " inspiratory wheezes throughout  CV: regular rate and rhythm, normal S1 S2, no S3 or S4, no murmur, click or rub, no peripheral edema and peripheral pulses strong  SKIN: no suspicious lesions or rashes  PSYCH: mentation appears normal, affect normal/bright    Results for orders placed or performed in visit on 09/27/23 (from the past 24 hour(s))   CBC with platelets and differential    Narrative    The following orders were created for panel order CBC with platelets and differential.  Procedure                               Abnormality         Status                     ---------                               -----------         ------                     CBC with platelets and d...[[id number]]  Abnormal            Final result                 Please view results for these tests on the individual orders.   CBC with platelets and differential   Result Value Ref Range    WBC Count 7.8 4.0 - 11.0 10e3/uL    RBC Count 5.25 4.40 - 5.90 10e6/uL    Hemoglobin 16.2 13.3 - 17.7 g/dL    Hematocrit 48.7 40.0 - 53.0 %    MCV 93 78 - 100 fL    MCH 30.9 26.5 - 33.0 pg    MCHC 33.3 31.5 - 36.5 g/dL    RDW 12.6 10.0 - 15.0 %    Platelet Count 171 150 - 450 10e3/uL    % Neutrophils 64 %    % Lymphocytes 15 %    % Monocytes 8 %    % Eosinophils 12 %    % Basophils 1 %    % Immature Granulocytes 0 %    Absolute Neutrophils 5.0 1.6 - 8.3 10e3/uL    Absolute Lymphocytes 1.2 0.8 - 5.3 10e3/uL    Absolute Monocytes 0.7 0.0 - 1.3 10e3/uL    Absolute Eosinophils 0.9 (H) 0.0 - 0.7 10e3/uL    Absolute Basophils 0.1 0.0 - 0.2 10e3/uL    Absolute Immature Granulocytes 0.0 <=0.4 10e3/uL

## 2023-09-27 NOTE — COMMUNITY RESOURCES LIST (ENGLISH)
09/27/2023   [facility] - Outpatient Clinics  N/A  For additional resource needs, please contact your health insurance member services or your primary care team.  Phone: [phone]   Email: N/A   Address: [address] [place] [address]   Hours: N/A        Financial Stability       Rent and mortgage payment assistance  1  [facility] - Outpatient Addiction Treatment Distance: 1.58 miles      In-Person, Phone/Virtual   [address] [facility] [address] [place] [address]  Language: English, Croatian  Hours: Mon - Fri 8:00 AM - 4:30 PM  Fees: Insurance   Phone: (449) 492-9839 Email: roots@[employer] Website: https://[employer]./roots/     2  [facility] [employer] [place] - Multifamily Rental Assistance Program Distance: 1.6 miles      Phone/Virtual   [address] [facility] 400 [place] [address]  Language: English  Hours: Mon - Fri 8:00 AM - 5:00 PM Appt. Only  Fees: Free   Phone: (729) 191-8203 Email: [place].[employer]@[facility] Website: https://www.[facility].gov/index.html          Food and Nutrition       Food pantry  3  [facility] Distance: 0.87 miles      [facility]   [address] [place] [address]  Language: English, Croatian  Hours: Mon 9:00 AM - 6:00 PM Appt. Only, Tue - Fri 9:00 AM - 5:00 PM Appt. Only  Fees: Free   Phone: (300) 836-1845 Email: info@[employer].org Website: http://www.Zeus.org/site     4  [facility] [place] Distance: 1.62 miles      [facility]   [address] [place] [address]  Language: English, Hmong, Ukrainian, Croatian  Hours: Mon 11:00 AM - 3:00 PM , Wed 11:00 AM - 3:00 PM , Fri 11:00 AM - 3:00 PM  Fees: Free, Self Pay   Phone: (811) 395-1608 Email: Claude@[facility].salvationarmy.org Website: http://salvationarmynorth.org/community/ti-vgme-g-[facility]/     SNAP application assistance  5  [employer] [place] Distance: 0.83 miles      Phone/Virtual   [address] [place] [address]  Language: English, Hmong, Niuean, Moldovan, Albanian  Hours: Mon - Fri 8:30 AM - 4:30 PM  Fees: Free   Phone: (288) 684-4736 Email: helpline@hungersolutions.org Website: https://www.hungersolutions.org/programs/[place]-food-helpline/     6  [place] Department of Human Services - [employer]NF[employer] (SNAP) Distance: 1.59 miles      Phone/Virtual   [address] [address] [place] [address]  Language: English, Hmong, Niuean, Moldovan, Albanian, Bengali  Hours: Mon - Fri 9:00 AM - 5:00 PM  Fees: Free   Phone: (428) 842-3333 Website: https://[place].gov/dhs/people-we-serve/adults/economic-assistance/food-nutrition/programs-and-services/supplemental-nutrition-assistance-program.jsp     Soup kitchen or free meals  7  [facility] [place] [facility] - Loaves and Fishes Distance: 1.33 miles      In-Person   [address] [place] [address]  Language: English  Hours: Mon - Sun 4:30 PM - 5:30 PM  Fees: Free   Phone: (261) 979-4370 Email: info@[employer] Website: https://www.SavingGlobal.org/locations/[facility]/     8  [facility] [place] [facility] - Free Summer Meals Distance: 1.51 miles      In-Person   [address] [place] [address]  Language: English, Hmong, Albanian  Hours: Mon - Fri 12:00 PM - 1:00 PM , Mon - Fri 3:00 PM - 4:00 PM  Fees: Free   Phone: (770) 555-7602 Email: Nico@.[facility].mn. Website: https://www.[facility]/departments/thao-recreation/[facility]          Important Numbers & Websites       [address]   [address] Critical access hospitalitedway.org  Poison Control   (808) 240-2823 [place]  Suicide and Crisis Lifeline   [address] [place], [place] [address]line.org  [facility] Child Abuse Hotline   201.304.1559 Childhelphotline.org  National Sexual Assault Hotline   (899) 969-2530 (HOPE) Rainn.org  National Runaway Safeline   (234) 943-6604 (RUNAWAY) Xango.comrunaVisuMotion.org  Pregnancy & Postpartum Support  [place]   Call/text 121-381-9261 Ppsupportmn.org  Substance Abuse National Helpline ([facility]   421-050-HELP (1360) Findtreatment.gov  Emergency Services   918

## 2023-09-28 LAB
ANION GAP SERPL CALCULATED.3IONS-SCNC: 12 MMOL/L (ref 7–15)
BUN SERPL-MCNC: 18.5 MG/DL (ref 6–20)
CALCIUM SERPL-MCNC: 9.4 MG/DL (ref 8.6–10)
CHLORIDE SERPL-SCNC: 106 MMOL/L (ref 98–107)
CREAT SERPL-MCNC: 0.7 MG/DL (ref 0.67–1.17)
EGFRCR SERPLBLD CKD-EPI 2021: >90 ML/MIN/1.73M2
GLUCOSE SERPL-MCNC: 75 MG/DL (ref 70–99)
HCO3 SERPL-SCNC: 22 MMOL/L (ref 22–29)
POTASSIUM SERPL-SCNC: 4.1 MMOL/L (ref 3.4–5.3)
SODIUM SERPL-SCNC: 140 MMOL/L (ref 135–145)

## 2023-10-06 ENCOUNTER — TELEPHONE (OUTPATIENT)
Dept: FAMILY MEDICINE | Facility: CLINIC | Age: 53
End: 2023-10-06

## 2023-10-06 NOTE — TELEPHONE ENCOUNTER
Please call patient and ask him to schedule a physical exam/follow up visit (for asthma exacerbation) with Dr. Goodson whenever it works within 3 months.    thanks

## 2023-10-09 NOTE — TELEPHONE ENCOUNTER
Spoke to patient and scheduled.     With Family Practice (Miguel Angel Goodson MD)  12/18/2023 at 11:00 AM 10:40 am arrival

## 2023-10-27 ENCOUNTER — TELEPHONE (OUTPATIENT)
Dept: FAMILY MEDICINE | Facility: CLINIC | Age: 53
End: 2023-10-27
Payer: COMMERCIAL

## 2023-10-27 NOTE — TELEPHONE ENCOUNTER
----- Message from Anne Guy MD sent at 10/25/2023  8:57 PM CDT -----  Team - please let Mary know his lab results from 9/27. His blood count was normal but showed slightly elevated eosinophils which are allergy cells. His electrolytes were fine. Dr. Guy wants to know if he recovered from his sickness/breathing problems?

## 2023-10-27 NOTE — LETTER
November 1, 2023      Mary [address]  [place] [address]        Dear ,    We are writing to inform you of your test results from 9/27. Your blood count was normal but showed slightly elevated eosinophils which are allergy cells. Your electrolytes were fine. Dr. Guy wants to know if you recovered from your sickness/breathing problems?     Resulted Orders   Basic metabolic panel  (Ca, Cl, CO2, Creat, Gluc, K, Na, BUN)   Result Value Ref Range    Sodium 140 135 - 145 mmol/L      Comment:      Reference intervals for this test were updated on 09/26/2023 to more accurately reflect our healthy population. There may be differences in the flagging of prior results with similar values performed with this method. Interpretation of those prior results can be made in the context of the updated reference intervals.     Potassium 4.1 3.4 - 5.3 mmol/L    Chloride 106 98 - 107 mmol/L    Carbon Dioxide (CO2) 22 22 - 29 mmol/L    Anion Gap 12 7 - 15 mmol/L    Urea Nitrogen 18.5 6.0 - 20.0 mg/dL    Creatinine 0.70 0.67 - 1.17 mg/dL    GFR Estimate >90 >60 mL/min/1.73m2    Calcium 9.4 8.6 - 10.0 mg/dL    Glucose 75 70 - 99 mg/dL   CBC with platelets and differential   Result Value Ref Range    WBC Count 7.8 4.0 - 11.0 10e3/uL    RBC Count 5.25 4.40 - 5.90 10e6/uL    Hemoglobin 16.2 13.3 - 17.7 g/dL    Hematocrit 48.7 40.0 - 53.0 %    MCV 93 78 - 100 fL    MCH 30.9 26.5 - 33.0 pg    MCHC 33.3 31.5 - 36.5 g/dL    RDW 12.6 10.0 - 15.0 %    Platelet Count 171 150 - 450 10e3/uL    % Neutrophils 64 %    % Lymphocytes 15 %    % Monocytes 8 %    % Eosinophils 12 %    % Basophils 1 %    % Immature Granulocytes 0 %    Absolute Neutrophils 5.0 1.6 - 8.3 10e3/uL    Absolute Lymphocytes 1.2 0.8 - 5.3 10e3/uL    Absolute Monocytes 0.7 0.0 - 1.3 10e3/uL    Absolute Eosinophils 0.9 (H) 0.0 - 0.7 10e3/uL    Absolute Basophils 0.1 0.0 - 0.2 10e3/uL    Absolute Immature Granulocytes 0.0 <=0.4 10e3/uL       If you have any  questions or concerns, please call the clinic at the number listed above.       Sincerely,      [facility]

## 2023-10-30 NOTE — TELEPHONE ENCOUNTER
CMT attempted to reach the patient x 2. CMT unable to leave voicemail, unable to reach the patient. Please review message thread below and advise the patient as indicated. Please schedule if necessary or indicated in message thread.       Voice message system is full. Unable to leave voicemail.

## 2023-10-31 ASSESSMENT — ENCOUNTER SYMPTOMS: SHORTNESS OF BREATH: 1

## 2023-11-01 NOTE — TELEPHONE ENCOUNTER
Called pt x 3 and unable to leave message as mailbox is full. Attempted to call pt's mobile number but the number does not belong to pt.      Per protocol, letter sent.      Dio Brandon, BSN RN  [facility]

## 2023-12-18 ENCOUNTER — OFFICE VISIT (OUTPATIENT)
Dept: FAMILY MEDICINE | Facility: CLINIC | Age: 53
End: 2023-12-18
Payer: COMMERCIAL

## 2023-12-18 ENCOUNTER — LAB (OUTPATIENT)
Dept: FAMILY MEDICINE | Facility: CLINIC | Age: 53
End: 2023-12-18

## 2023-12-18 VITALS
BODY MASS INDEX: 26.31 KG/M2 | RESPIRATION RATE: 16 BRPM | SYSTOLIC BLOOD PRESSURE: 110 MMHG | HEART RATE: 69 BPM | OXYGEN SATURATION: 97 % | TEMPERATURE: 98.2 F | DIASTOLIC BLOOD PRESSURE: 70 MMHG | WEIGHT: 134 LBS | HEIGHT: 60 IN

## 2023-12-18 DIAGNOSIS — Z11.59 NEED FOR HEPATITIS C SCREENING TEST: ICD-10-CM

## 2023-12-18 DIAGNOSIS — Z11.4 SCREENING FOR HIV (HUMAN IMMUNODEFICIENCY VIRUS): ICD-10-CM

## 2023-12-18 DIAGNOSIS — G47.00 INSOMNIA, UNSPECIFIED TYPE: ICD-10-CM

## 2023-12-18 DIAGNOSIS — J45.20 MILD INTERMITTENT ASTHMA WITHOUT COMPLICATION: ICD-10-CM

## 2023-12-18 DIAGNOSIS — D69.6 LOW PLATELET COUNT (H): ICD-10-CM

## 2023-12-18 DIAGNOSIS — Z00.00 ROUTINE GENERAL MEDICAL EXAMINATION AT HEALTH CARE FACILITY: Primary | ICD-10-CM

## 2023-12-18 DIAGNOSIS — Z12.11 SCREEN FOR COLON CANCER: ICD-10-CM

## 2023-12-18 DIAGNOSIS — K21.00 GASTROESOPHAGEAL REFLUX DISEASE WITH ESOPHAGITIS WITHOUT HEMORRHAGE: ICD-10-CM

## 2023-12-18 LAB
CHOLEST SERPL-MCNC: 131 MG/DL
ERYTHROCYTE [DISTWIDTH] IN BLOOD BY AUTOMATED COUNT: 12.4 % (ref 10–15)
FASTING STATUS PATIENT QL REPORTED: YES
HBA1C MFR BLD: 5.9 % (ref 0–5.6)
HCT VFR BLD AUTO: 48.3 % (ref 40–53)
HDLC SERPL-MCNC: 39 MG/DL
HGB BLD-MCNC: 16 G/DL (ref 13.3–17.7)
LDLC SERPL CALC-MCNC: 76 MG/DL
MCH RBC QN AUTO: 30.7 PG (ref 26.5–33)
MCHC RBC AUTO-ENTMCNC: 33.1 G/DL (ref 31.5–36.5)
MCV RBC AUTO: 93 FL (ref 78–100)
NONHDLC SERPL-MCNC: 92 MG/DL
PLATELET # BLD AUTO: 183 10E3/UL (ref 150–450)
RBC # BLD AUTO: 5.22 10E6/UL (ref 4.4–5.9)
TRIGL SERPL-MCNC: 80 MG/DL
WBC # BLD AUTO: 8.7 10E3/UL (ref 4–11)

## 2023-12-18 PROCEDURE — 99213 OFFICE O/P EST LOW 20 MIN: CPT | Mod: 25 | Performed by: FAMILY MEDICINE

## 2023-12-18 PROCEDURE — 36415 COLL VENOUS BLD VENIPUNCTURE: CPT | Performed by: FAMILY MEDICINE

## 2023-12-18 PROCEDURE — 83036 HEMOGLOBIN GLYCOSYLATED A1C: CPT | Performed by: FAMILY MEDICINE

## 2023-12-18 PROCEDURE — 85027 COMPLETE CBC AUTOMATED: CPT | Performed by: FAMILY MEDICINE

## 2023-12-18 PROCEDURE — 86803 HEPATITIS C AB TEST: CPT | Performed by: FAMILY MEDICINE

## 2023-12-18 PROCEDURE — 99396 PREV VISIT EST AGE 40-64: CPT | Performed by: FAMILY MEDICINE

## 2023-12-18 PROCEDURE — 80061 LIPID PANEL: CPT | Performed by: FAMILY MEDICINE

## 2023-12-18 PROCEDURE — 87389 HIV-1 AG W/HIV-1&-2 AB AG IA: CPT | Performed by: FAMILY MEDICINE

## 2023-12-18 RX ORDER — ALBUTEROL SULFATE 90 UG/1
2 AEROSOL, METERED RESPIRATORY (INHALATION) EVERY 4 HOURS PRN
Qty: 18 G | Refills: 11 | Status: SHIPPED | OUTPATIENT
Start: 2023-12-18

## 2023-12-18 RX ORDER — FLUTICASONE PROPIONATE AND SALMETEROL 250; 50 UG/1; UG/1
1 POWDER RESPIRATORY (INHALATION) 2 TIMES DAILY
Qty: 1 EACH | Refills: 11 | Status: SHIPPED | OUTPATIENT
Start: 2023-12-18

## 2023-12-18 ASSESSMENT — ENCOUNTER SYMPTOMS
PALPITATIONS: 0
CONSTIPATION: 0
CHILLS: 0
DIZZINESS: 0
HEMATURIA: 0
HEMATOCHEZIA: 0
ARTHRALGIAS: 0
COUGH: 1
MYALGIAS: 0
JOINT SWELLING: 0
FEVER: 0
FREQUENCY: 0
ABDOMINAL PAIN: 0
HEADACHES: 0
DYSURIA: 0
DIARRHEA: 0
NAUSEA: 0
EYE PAIN: 0
SORE THROAT: 1
NERVOUS/ANXIOUS: 0
HEARTBURN: 1
PARESTHESIAS: 0
WEAKNESS: 0
SHORTNESS OF BREATH: 1

## 2023-12-18 NOTE — COMMUNITY RESOURCES LIST (ENGLISH)
12/18/2023   [facility]  [facility]A  For questions about this resource list or additional care needs, please contact your primary care clinic or care manager.  Phone: [phone]   Email: N/A   Address: [address] [place] [address]   Hours: N/A        Hotlines and Helplines       Hotline - Housing crisis  1  [facility] Distance: 6.93 miles      Phone/Virtual   [address] [place] [address]  Language: English  Hours: Mon - Sun Open 24 Hours   Phone: (986) 614-7191 Email: communications@[facility]-mn.org Website: https://oscs-[place].org/oursaviourshousing/     2  [facility] Distance: 8.53 miles      Phone/Virtual   [address] [place] [address]  Language: English  Hours: Mon - Sun Open 24 Hours   Phone: (111) 427-8499 Email: info@[facility].[employer]ing Website: http://www.[facility].org          Housing       Coordinated Entry access point  3  [facility] Distance: 1.37 miles      In-Person, Phone/Virtual   [address] [place] [address]  Language: English  Hours: Mon - Fri 8:30 AM - 4:30 PM  Fees: Free   Phone: (185) 657-4922 Email: info@[facility].org Website: https://www.[facility].org/locations/[facility]/     4  [facility] - Coordinated Access to Housing and Shelter (CAHS) - Coordinated Access - Coordinated Entry access point Distance: 1.69 miles      In-Person, Phone/Virtual   [address] [place] [address]  Language: English  Hours: Mon - Fri 8:00 AM - 4:30 PM  Fees: Free   Phone: (241) 761-7181 Website: https://www.[facility]./residents/assistance-support/assistance/housing-services-support     Drop-in center or day shelter  5  [facility] Distance: 2.25 miles      In-Person   [address] [place] [address]  Language: English  Hours: Mon - Fri 9:00 AM - 4:00 PM  Fees: Free   Phone: (823) 193-9163 Email: marilia@[facility].org Website:  http://[facility] Hospital[facility].org     6  [facility] Distance: 6.97 miles      In-Person   [address] [place] [address]  Language: English, Guinean, Faroese  Hours: Mon - Sat 7:00 AM - 3:00 PM  Fees: Free, Self Pay   Phone: (629) 575-9405 Email: info@[employer] Website: https://www.[employer]/locations/opportunity-center/     Housing search assistance  7  [facility] [place] - Mental Health Crisis Housing Search Assistance Distance: 2.95 miles      In-Person, Phone/Virtual   [address] [place] [address]  Language: English  Hours: Mon - Tue 8:00 AM - 4:30 PM , Wed 8:00 AM - 6:00 PM , Thu - Fri 8:00 AM - 4:30 PM  Fees: Free   Phone: (192) 543-5760 Email: [facility]@co.[facility]. Website: https://www.[facility]./[facility]/health-medical/clinics-services/mental-health/adult-mental-health     8  [facility] - Housing Search Assistance Distance: 3.02 miles      Phone/Virtual   [address] [place] [address]  Language: Turkmen, English, Hmong, Dianne, Guinean, Faroese  Hours: Mon - Fri Appt. Only  Fees: Free   Phone: (236) 275-9737 Website: https://[facility]ighborhoodThe [facility]'s Hospital Foundation.org/     Shelter for families  9  [facility] Distance: 14.89 miles      In-Person   [address] [place] [address]  Language: English  Hours: Mon - Fri 3:00 PM - 9:00 AM , Sat - Sun Open 24 Hours  Fees: Free   Phone: (517) 842-6001 Ext.1 Website: https://www.saintandrews.org/2020/07/03/emergency-family-shelter/     Shelter for individuals  10  [facility] [place] [facility] - [facility] [place] [facility] Distance: 1.33 miles      In-Person   [address] [place] [address]  Language: English  Hours: Mon - Sun 5:00 PM - 10:00 AM  Fees: Free, Self Pay   Phone: (599) 203-3777 Email: info@[employer].org Website:  https://www.cctwincities.org/locations/higher-ground-[place]/     11  [facility] - Coordinated Access to Housing and Shelter (CAHS) - Coordinated Access - Emergency housing Distance: 1.69 miles      In-Person, Phone/Virtual   [address] [place] [address]  Language: English  Hours: Mon - Fri 8:00 AM - 4:30 PM  Fees: Free   Phone: (318) 838-5705 Website: https://www.[facility]./residents/assistance-support/assistance/housing-services-support          Important Numbers & Websites       Emergency Services   911  [facility]   311  Poison Control   (400) 972-6130  Suicide Prevention Lifeline   (569) 898-9460 (TALK)  Child Abuse Hotline   (146) 449-3154 (4-A-Child)  Sexual Assault Hotline   (382) 937-6510 (HOPE)  National Runaway Safeline   (723) 989-8525 (RUNAWAY)  All-Options Talkline   (732) 279-4999  Substance Abuse Referral   (253) 632-6826 (HELP)

## 2023-12-18 NOTE — COMMUNITY RESOURCES LIST (ENGLISH)
12/18/2023   [facility] - Outpatient Clinics  N/A  For additional resource needs, please contact your health insurance member services or your primary care team.  Phone: [phone]   Email: N/A   Address: [facility] [place] [address]   Hours: N/A        Hotlines and Helplines       Hotline - Housing crisis  1  [facility] Distance: 6.93 miles      Phone/Virtual   [address] [place] [address]  Language: English  Hours: Mon - Sun Open 24 Hours   Phone: (297) 711-4958 Email: communications@oscs-mn.org Website: https://oscs-mn.org/oursaviourshousing/     2  [facility] Distance: 8.53 miles      Phone/Virtual   [address] [place] [address]  Language: English  Hours: Mon - Sun Open 24 Hours   Phone: (235) 128-2339 Email: info@[facility].org Website: http://www.[facility].org          Housing       Coordinated Entry access point  3  [facility] Distance: 1.37 miles      In-Person, Phone/Virtual   [address] [place] [address]  Language: English  Hours: Mon - Fri 8:30 AM - 4:30 PM  Fees: Free   Phone: (734) 555-2362 Email: info@[facility].org Website: https://www.[facility].org/locations/[facility]/     4  [facility] - Coordinated Access to Housing and Shelter (CAHS) - Coordinated Access - Coordinated Entry access point Distance: 1.69 miles      In-Person, Phone/Virtual   [address] [place] [address]  Language: English  Hours: Mon - Fri 8:00 AM - 4:30 PM  Fees: Free   Phone: (912) 454-6312 Website: https://www.[facility]./residents/assistance-support/assistance/housing-services-support     Drop-in center or day shelter  5  [facility] Distance: 2.25 miles      In-Person   [address] [place] [address]  Language: English  Hours: Mon - Fri 9:00 AM - 4:00 PM  Fees: Free   Phone: (876) 512-4828 Email: frontgeorgiak@listeninghouse.org Website:  http://listeninghouse.org     6  [facility] [place] [facility] Distance: 6.97 miles      In-Person   [address] [place] [address]  Language: English, Montenegrin, Nicaraguan  Hours: Mon - Sat 7:00 AM - 3:00 PM  Fees: Free, Self Pay   Phone: (170) 331-1620 Email: info@[employer] Website: https://www.popAD/locations/opportunity-center/     Housing search assistance  7  [employer] - https://[employer]ie/ Distance: 7.16 miles      Phone/Virtual   [address] [place] [address]  Language: English  Hours: Mon - Sun Open 24 Hours   Email: info@[employer] Website: https://Spotie     8  [employer] - Online housing search assistance Distance: 8.32 miles      Phone/Virtual   [address] [address]  Language: English, Hmong, Montenegrin, Nicaraguan  Hours: Mon - Sun Open 24 Hours   Phone: (222) 329-7937 Email: info@[employer] Website: http://www.Organovo Holdingslink.org/     Shelter for families  9  [facility] Distance: 14.89 miles      In-Person   [address] [place] [address]  Language: English  Hours: Mon - Fri 3:00 PM - 9:00 AM , Sat - Sun Open 24 Hours  Fees: Free   Phone: (171) 269-4440 Ext.1 Website: https://www.saintAt[facility] Medical Centerdigedu.org/2020/07/03/emergency-family-shelter/     Shelter for individuals  10  [facility] [place] [facility] [place] [facility] - [facility] [place] [facility] Distance: 1.33 miles      In-Person   [address] [place] [address]  Language: English  Hours: Mon - Sun 5:00 PM - 10:00 AM  Fees: Free, Self Pay   Phone: (381) 830-6214 Email: info@popAD Website: https://www.popAD/locations/[facility]-s[place]/     11  [facility] - Coordinated Access to Housing and Shelter (CAHS) - Coordinated Access - Emergency housing Distance: 1.69 miles       In-Person, Phone/Virtual   [address] [place] [address]  Language: English  Hours: Mon - Fri 8:00 AM - 4:30 PM  Fees: Free   Phone: (565) 564-5861 Website: https://www.[employer]./residents/assistance-support/assistance/housing-services-support          Important Numbers & Websites       [address].[facility]  Poison Control   (920) 389-1833 [place].org  Suicide and Crisis Lifeline   [address] [place], [place] [address]line.org  Childhelp [place] Child Abuse Hotline   426.264.8807 Childhelphotline.org  [place] Sexual Assault Hotline   (686) 794-2954 (HOPE) [facility] Runaway Safeline   (947) 731-6521 (RUNAWAY) [facility] Hospital[facility].org  Pregnancy & Postpartum Support [place]   Call/text 434-171-4344 Ppsupportmn.org  Substance Abuse National Helpline ([facility]   152-205-HELP (4910) Findtreatment.gov  Emergency Services   911

## 2023-12-18 NOTE — PROGRESS NOTES
ASSESMENT AND PLAN:    Physical, Health Maitenence -   Reviewed healthy lifestyle, diet, exercise, vitamins, and follow-up plan today with patient.  Reviewed age appropriate cancer and other screening recommendations.  Immunization review and update done.  Reviewed indicated lab tests, see lab orders.   -     Lipid panel reflex to direct LDL Non-fasting; Future  -     Hemoglobin A1c; Future  For the ear canal I recommended that he use a 50-50 mixture of rubbing alcohol and white vinegar.  Screen for colon cancer  -     COLOGUARD(EX[employer] SCIENCES); Future  Screening for HIV (human immunodeficiency virus)  -     HIV Antigen Antibody Combo; Future  Need for hepatitis C screening test  -     Hepatitis C Screen Reflex to HCV RNA Quant and Genotype; Future  Mild intermittent asthma without complication  -     fluticasone-salmeterol (ADVAIR DISKUS) 250-50 MCG/ACT inhaler; Inhale 1 puff into the lungs 2 times daily (Or any covered similar per pharmacist)  -     albuterol (PROAIR HFA/PROVENTIL HFA/VENTOLIN HFA) 108 (90 Base) MCG/ACT inhaler; Inhale 2 puffs into the lungs every 4 hours as needed for shortness of breath, wheezing or cough  Gastroesophageal reflux disease with esophagitis  -     omeprazole (PRILOSEC) 20 MG DR capsule; TAKE 1 CAPSULE 30 MINUTES BEFORE FIRST MEAL DAILY FOR STOMACH // 1 HNUB NOJ 1 LUB UA NTEJ NOJ TSHAIS PAB ALE MOB PLAB/MOB NCAUJ PLAB  Low platelet count (H24)  -     CBC with platelets; Future  Insomnia, unspecified type  Counseling done with the patient on options.  He would like to try melatonin, will start with 3 mg and titrate up to as high as 9 mg at bedtime as needed, follow-up if not improving.      HPI: Patient reports he gets intermittent itching and irritation of his right ear canal.  He has a known history of asthma and recently ran out of albuterol.  Currently he is not having an exacerbation but he did have a significant exacerbation over the summer.  He also has a history of acid  reflux and his symptoms are fully controlled with omeprazole but he also recently ran out of omeprazole.  Patient reports that he is not sleeping well most nights.  When he does sleep others around him report that he is a loud snorer.  They have not reported apnea spells.  Patient reports that the main difficulty is just falling asleep.        Past Medical History:   Diagnosis Date    Acute respiratory failure with hypoxia (H)     Community acquired pneumonia of right lower lobe of lung     Fibrothorax     Scarring left lung    Hiatal hernia     Pneumonia     TB lung, latent 1985       Current Outpatient Medications   Medication Sig Dispense Refill    albuterol (PROAIR HFA/PROVENTIL HFA/VENTOLIN HFA) 108 (90 Base) MCG/ACT inhaler Inhale 2 puffs into the lungs every 4 hours as needed for shortness of breath, wheezing or cough 18 g 11    albuterol (PROVENTIL) (2.5 MG/3ML) 0.083% neb solution NEBULIZE 1 VIAL EVERY 4 HOURS AS NEEDED FOR WHEEZING. Strength: (2.5 MG/3ML) 0.083% 90 mL 11    fluticasone-salmeterol (ADVAIR DISKUS) 250-50 MCG/ACT inhaler Inhale 1 puff into the lungs 2 times daily (Or any covered similar per pharmacist) 1 each 11    omeprazole (PRILOSEC) 20 MG DR capsule TAKE 1 CAPSULE 30 MINUTES BEFORE FIRST MEAL DAILY FOR STOMACH // 1 HNUB NOJ 1 LUB UA NTEJ NOJ TSHAIS PAB ALE MOB PLAB/MOB NCAUJ PLAB 90 capsule 3       Patient Active Problem List   Diagnosis    Anaphylaxis    Hiatal Hernia    Chest Pain    Nonspecific reaction to tuberculin skin test without active tuberculosis    Left Ventricular Hypertrophy    Diastolic Dysfunction    Primary Vitiligo    Fibrothorax    Pre-diabetes    Tachycardia    Dyspnea    Hypokalemia    Moderate asthma with exacerbation, unspecified whether persistent    Aspiration pneumonitis (H)    Low platelet count (H24)    History of tuberculosis       Social History     Socioeconomic History    Marital status:      Spouse name: None    Number of children: None    Years of  education: None    Highest education level: None   Tobacco Use    Smoking status: Never     Passive exposure: Never    Smokeless tobacco: Never    Tobacco comments:     no passive exposure   Vaping Use    Vaping Use: Never used   Substance and Sexual Activity    Alcohol use: No    Drug use: No     Social Determinants of Health     Financial Resource Strain: Low Risk  (12/18/2023)    Financial Resource Strain     Within the past 12 months, have you or your family members you live with been unable to get utilities (heat, electricity) when it was really needed?: No   Food Insecurity: Low Risk  (12/18/2023)    Food Insecurity     Within the past 12 months, did you worry that your food would run out before you got money to buy more?: No     Within the past 12 months, did the food you bought just not last and you didn t have money to get more?: No   Recent Concern: Food Insecurity - High Risk (9/27/2023)    Food Insecurity     Within the past 12 months, did you worry that your food would run out before you got money to buy more?: Yes     Within the past 12 months, did the food you bought just not last and you didn t have money to get more?: No   Transportation Needs: Low Risk  (12/18/2023)    Transportation Needs     Within the past 12 months, has lack of transportation kept you from medical appointments, getting your medicines, non-medical meetings or appointments, work, or from getting things that you need?: No   Interpersonal Safety: Low Risk  (12/18/2023)    Interpersonal Safety     Do you feel physically and emotionally safe where you currently live?: Yes     Within the past 12 months, have you been hit, slapped, kicked or otherwise physically hurt by someone?: No     Within the past 12 months, have you been humiliated or emotionally abused in other ways by your partner or ex-partner?: No   Housing Stability: High Risk (12/18/2023)    Housing Stability     Do you have housing? : No     Are you worried about losing  "your housing?: No       History   Smoking Status    Never   Smokeless Tobacco    Never       OBJECTICE: /70   Pulse 69   Temp 98.2  F (36.8  C) (Oral)   Resp 16   Ht 1.466 m (4' 9.7\")   Wt 60.8 kg (134 lb)   SpO2 97%   BMI 28.30 kg/m        Gen - alert, orientated, NAD  Eyes - fundascopic exam limited by the undialated pupil but looks symmetric  ENT - oropharynx clear, TMs clear  Neck - supple, no palpable mass or lymphadenopathy  CV - RRR, no murmur  Resp - lungs CTA  Ab - soft, nontender, no palpable mass or organomegaly   - normal appearance to the external genetalia, normal testicular exam bilaterally, no hernia  Extrem - warm, no edema  Neuro - CN II-XII intact, strength, sensation, reflexes intact and symmetric  Skin - no rash, no atypical appearing lesions seen.           12/18/2023    10:48 AM   Additional Questions   Roomed by Mamie NGUYEN       Healthy Habits:     Getting at least 3 servings of Calcium per day:  NO    Bi-annual eye exam:  NO    Dental care twice a year:  Yes    Sleep apnea or symptoms of sleep apnea:  Excessive snoring    Diet:  Low salt and Breakfast skipped    Frequency of exercise:  2-3 days/week    Duration of exercise:  15-30 minutes    Taking medications regularly:  Yes    Medication side effects:  None    Additional concerns today:  No      Social History     Tobacco Use    Smoking status: Never     Passive exposure: Never    Smokeless tobacco: Never    Tobacco comments:     no passive exposure   Substance Use Topics    Alcohol use: No             12/18/2023    10:31 AM   Alcohol Use   Prescreen: >3 drinks/day or >7 drinks/week? No       Review of Systems   Constitutional:  Negative for chills and fever.   HENT:  Positive for ear pain and sore throat. Negative for congestion and hearing loss.    Eyes:  Negative for pain and visual disturbance.   Respiratory:  Positive for cough and shortness of breath.    Cardiovascular:  Positive for chest pain. Negative for palpitations " and peripheral edema.   Gastrointestinal:  Positive for heartburn. Negative for abdominal pain, constipation, diarrhea, hematochezia and nausea.   Genitourinary:  Positive for genital sores. Negative for dysuria, frequency, hematuria, impotence, penile discharge and urgency.   Musculoskeletal:  Negative for arthralgias, joint swelling and myalgias.   Skin:  Negative for rash.   Neurological:  Negative for dizziness, weakness, headaches and paresthesias.   Psychiatric/Behavioral:  Negative for mood changes. The patient is not nervous/anxious.      Clarifications-no source, chest pain is chronic and occurs only with asthma exacerbations, the ear pain is more of an itching and irritation he feels in the right ear canal intermittently, shortness of breath only occurs with asthma exacerbations and responds well to his asthma medications.  Heartburn is under excellent control with omeprazole but he ran out.  No skin lesions that have been changing shape or color.  Patient has loud snoring but does not have apnea spells.

## 2023-12-19 ENCOUNTER — TELEPHONE (OUTPATIENT)
Dept: FAMILY MEDICINE | Facility: CLINIC | Age: 53
End: 2023-12-19
Payer: COMMERCIAL

## 2023-12-19 LAB
HCV AB SERPL QL IA: NONREACTIVE
HIV 1+2 AB+HIV1 P24 AG SERPL QL IA: NONREACTIVE

## 2023-12-19 NOTE — TELEPHONE ENCOUNTER
----- Message from Miguel Angel Goodson MD sent at 12/19/2023  4:42 PM CST -----  Team - please call patient with results.  A1c has improved significantly, other labs are normal.

## 2023-12-19 NOTE — LETTER
December 26, 2023      Mary Be[address]  [place] [address]        Dear ,    We are writing to inform you of your test results.    Your hemoglobin A1c (blood sugar over 3-4 months) was improved! Other labs within acceptable ranges.     Resulted Orders   HIV Antigen Antibody Combo   Result Value Ref Range    HIV Antigen Antibody Combo Nonreactive Nonreactive      Comment:      HIV-1 p24 Ag & HIV-1/HIV-2 Ab Not Detected   Hepatitis C Screen Reflex to HCV RNA Quant and Genotype   Result Value Ref Range    Hepatitis C Antibody Nonreactive Nonreactive    Narrative    Assay performance characteristics have not been established for newborns, infants, and children.   Lipid panel reflex to direct LDL Non-fasting   Result Value Ref Range    Cholesterol 131 <200 mg/dL    Triglycerides 80 <150 mg/dL    Direct Measure HDL 39 (L) >=40 mg/dL    LDL Cholesterol Calculated 76 <=100 mg/dL    Non HDL Cholesterol 92 <130 mg/dL    Patient Fasting > 8hrs? Yes     Narrative    Cholesterol  Desirable:  <200 mg/dL    Triglycerides  Normal:  Less than 150 mg/dL  Borderline High:  150-199 mg/dL  High:  200-499 mg/dL  Very High:  Greater than or equal to 500 mg/dL    Direct Measure HDL  Female:  Greater than or equal to 50 mg/dL   Male:  Greater than or equal to 40 mg/dL    LDL Cholesterol  Desirable:  <100mg/dL  Above Desirable:  100-129 mg/dL   Borderline High:  130-159 mg/dL   High:  160-189 mg/dL   Very High:  >= 190 mg/dL    Non HDL Cholesterol  Desirable:  130 mg/dL  Above Desirable:  130-159 mg/dL  Borderline High:  160-189 mg/dL  High:  190-219 mg/dL  Very High:  Greater than or equal to 220 mg/dL   CBC with platelets   Result Value Ref Range    WBC Count 8.7 4.0 - 11.0 10e3/uL    RBC Count 5.22 4.40 - 5.90 10e6/uL    Hemoglobin 16.0 13.3 - 17.7 g/dL    Hematocrit 48.3 40.0 - 53.0 %    MCV 93 78 - 100 fL    MCH 30.7 26.5 - 33.0 pg    MCHC 33.1 31.5 - 36.5 g/dL    RDW 12.4 10.0 - 15.0 %    Platelet Count 183 150 -  450 10e3/uL   Hemoglobin A1c   Result Value Ref Range    Hemoglobin A1C 5.9 (H) 0.0 - 5.6 %      Comment:      Normal <5.7%   Prediabetes 5.7-6.4%    Diabetes 6.5% or higher     Note: Adopted from ADA consensus guidelines.       If you have any questions or concerns, please call the clinic at the number listed above.       Sincerely,

## 2024-02-03 LAB — NONINV COLON CA DNA+OCC BLD SCRN STL QL: NORMAL

## 2024-07-25 ENCOUNTER — OFFICE VISIT (OUTPATIENT)
Dept: FAMILY MEDICINE | Facility: CLINIC | Age: 54
End: 2024-07-25
Payer: COMMERCIAL

## 2024-07-25 VITALS
OXYGEN SATURATION: 94 % | SYSTOLIC BLOOD PRESSURE: 117 MMHG | DIASTOLIC BLOOD PRESSURE: 77 MMHG | WEIGHT: 136 LBS | HEART RATE: 69 BPM | HEIGHT: 60 IN | TEMPERATURE: 97.1 F | RESPIRATION RATE: 18 BRPM | BODY MASS INDEX: 26.7 KG/M2

## 2024-07-25 DIAGNOSIS — Z12.11 SCREEN FOR COLON CANCER: ICD-10-CM

## 2024-07-25 DIAGNOSIS — K64.9 HEMORRHOIDS, UNSPECIFIED HEMORRHOID TYPE: Primary | ICD-10-CM

## 2024-07-25 DIAGNOSIS — K92.1 BLOODY STOOL: ICD-10-CM

## 2024-07-25 PROBLEM — D69.6 LOW PLATELET COUNT (H): Status: RESOLVED | Noted: 2022-10-26 | Resolved: 2024-07-25

## 2024-07-25 PROCEDURE — 99214 OFFICE O/P EST MOD 30 MIN: CPT | Performed by: FAMILY MEDICINE

## 2024-07-25 RX ORDER — HYDROCORTISONE ACETATE 25 MG/1
25 SUPPOSITORY RECTAL 2 TIMES DAILY
Qty: 12 SUPPOSITORY | Refills: 1 | Status: SHIPPED | OUTPATIENT
Start: 2024-07-25

## 2024-07-25 ASSESSMENT — ASTHMA QUESTIONNAIRES
QUESTION_4 LAST FOUR WEEKS HOW OFTEN HAVE YOU USED YOUR RESCUE INHALER OR NEBULIZER MEDICATION (SUCH AS ALBUTEROL): ONE OR TWO TIMES PER DAY
QUESTION_3 LAST FOUR WEEKS HOW OFTEN DID YOUR ASTHMA SYMPTOMS (WHEEZING, COUGHING, SHORTNESS OF BREATH, CHEST TIGHTNESS OR PAIN) WAKE YOU UP AT NIGHT OR EARLIER THAN USUAL IN THE MORNING: FOUR OR MORE NIGHTS A WEEK
QUESTION_2 LAST FOUR WEEKS HOW OFTEN HAVE YOU HAD SHORTNESS OF BREATH: MORE THAN ONCE A DAY
ACT_TOTALSCORE: 10
ACT_TOTALSCORE: 10
QUESTION_5 LAST FOUR WEEKS HOW WOULD YOU RATE YOUR ASTHMA CONTROL: SOMEWHAT CONTROLLED
QUESTION_1 LAST FOUR WEEKS HOW MUCH OF THE TIME DID YOUR ASTHMA KEEP YOU FROM GETTING AS MUCH DONE AT WORK, SCHOOL OR AT HOME: SOME OF THE TIME

## 2024-07-25 NOTE — PROGRESS NOTES
"  Assessment & Plan     Hemorrhoids, unspecified hemorrhoid type  Management discussed, minimize constipation with good hydration, increase fiber intake, try stool softener as needed, Anusol suppository prescribed to help relieve the symptoms of hemorrhoid.  - hydrocortisone (ANUSOL-HC) 25 MG suppository; Place 1 suppository (25 mg) rectally 2 times daily    Bloody stool  Likely related to hemorrhoid, but discussed importance of screening colon cancer to rule out other causes.  - Colonoscopy Screening  Referral; Future    Screen for colon cancer  Discussed with patient today with risk and benefit, new referral placed.  - Colonoscopy Screening  Referral; Future    Review of external notes as documented elsewhere in note  30 minutes spent by me on the date of the encounter doing chart review, review of outside records, review of test results, interpretation of tests, patient visit, and documentation       BMI  Estimated body mass index is 28.7 kg/m  as calculated from the following:    Height as of this encounter: 1.466 m (4' 9.72\").    Weight as of this encounter: 61.7 kg (136 lb).   Weight management plan: Discussed healthy diet and exercise guidelines      Regular exercise    Phillip Hernandez is a 53 year old, presenting for the following health issues:  Hemorrhoids and Asthma (Pt stated he has been wheezing lot lately )      7/25/2024     9:54 AM   Additional Questions   Roomed by Chau     History of Present Illness       Reason for visit:  To see     He eats 0-1 servings of fruits and vegetables daily.He consumes 2 sweetened beverage(s) daily.He exercises with enough effort to increase his heart rate 9 or less minutes per day.  He exercises with enough effort to increase his heart rate 3 or less days per week.   He is taking medications regularly.       He is here because of possible hemorrhoids, he sometimes sees blood in the stool when he wipes, occasionally will get itching and pain " "when he ate spicy food.  No abdominal cramping no nausea vomiting or weight loss.  Has not tried any specific treatment.  Has not had a screening colonoscopy in the past.  He has moderate asthma, but the inhaler seems to help.  Denies any worsening symptoms.    Review of Systems  Constitutional, HEENT, cardiovascular, pulmonary, gi and gu systems are negative, except as otherwise noted.      Objective    /77 (BP Location: Left arm, Patient Position: Sitting, Cuff Size: Adult Regular)   Pulse 69   Temp 97.1  F (36.2  C) (Temporal)   Resp 18   Ht 1.466 m (4' 9.72\")   Wt 61.7 kg (136 lb)   SpO2 94%   BMI 28.70 kg/m    Body mass index is 28.7 kg/m .  Physical Exam   GENERAL: alert and no distress  NECK: no adenopathy, no asymmetry, masses, or scars  RESP: lungs clear to auscultation - no rales, rhonchi or wheezes  CV: regular rate and rhythm, normal S1 S2, no S3 or S4, no murmur, click or rub, no peripheral edema  ABDOMEN: soft, nontender, no hepatosplenomegaly, no masses and bowel sounds normal  RECTAL (male): normal sphincter tone, no rectal masses and small internal hemorrhoids on anoscopic exam.  MS: no gross musculoskeletal defects noted, no edema          Prior to immunization administration, verified patients identity using patient s name and date of birth. Please see Immunization Activity for additional information.     Screening Questionnaire for Adult Immunization    Are you sick today?   No   Do you have allergies to medications, food, a vaccine component or latex?   No   Have you ever had a serious reaction after receiving a vaccination?   No   Do you have a long-term health problem with heart, lung, kidney, or metabolic disease (e.g., diabetes), asthma, a blood disorder, no spleen, complement component deficiency, a cochlear implant, or a spinal fluid leak?  Are you on long-term aspirin therapy?   Yes   Do you have cancer, leukemia, HIV/AIDS, or any other immune system problem?   No   Do you " have a parent, brother, or sister with an immune system problem?   No   In the past 3 months, have you taken medications that affect  your immune system, such as prednisone, other steroids, or anticancer drugs; drugs for the treatment of rheumatoid arthritis, Crohn s disease, or psoriasis; or have you had radiation treatments?   No   Have you had a seizure, or a brain or other nervous system problem?   No   During the past year, have you received a transfusion of blood or blood    products, or been given immune (gamma) globulin or antiviral drug?   No   For women: Are you pregnant or is there a chance you could become       pregnant during the next month?   No   Have you received any vaccinations in the past 4 weeks?   No     Immunization questionnaire was positive for at least one answer.  Notified Dr Tracy.    This note was completed in part using a voice recognition software, any grammatical or context distortion are unintentional and inherent to the software.    Patient instructed to remain in clinic for 15 minutes afterwards, and to report any adverse reactions.     Screening performed by Chau Rodriguez on 7/25/2024 at 9:57 AM.   Signed Electronically by: Kim Tracy MD

## 2024-08-01 ENCOUNTER — TELEPHONE (OUTPATIENT)
Dept: GASTROENTEROLOGY | Facility: CLINIC | Age: 54
End: 2024-08-01
Payer: COMMERCIAL

## 2024-08-01 NOTE — TELEPHONE ENCOUNTER
"Endoscopy Scheduling Screen    Have you had a positive Covid test in the last 14 days?  No    What is your communication preference for Instructions and/or Bowel Prep?   Mail/USPS    What insurance is in the chart?  Other:  [facility]    Ordering/Referring Provider:   AUTUMN JAMA        (If ordering provider performs procedure, schedule with ordering provider unless otherwise instructed. )    BMI: Estimated body mass index is 28.7 kg/m  as calculated from the following:    Height as of 7/25/24: 1.466 m (4' 9.72\").    Weight as of 7/25/24: 61.7 kg (136 lb).     Sedation Ordered  moderate sedation.   If patient BMI > 50 do not schedule in [facility].    If patient BMI > 45 do not schedule at [facility].    Are you taking methadone or Suboxone?  No    Have you had difficulties, pain, or discomfort during past endoscopy procedures?  No    Are you taking any prescription medications for pain 3 or more times per week?   NO, No RN review required.    Do you have a history of malignant hyperthermia?  No    (Females) Are you currently pregnant?   No     Have you been diagnosed or told you have pulmonary hypertension?   No    Do you have an LVAD?  No    Have you been told you have moderate to severe sleep apnea?  No    Have you been told you have COPD, asthma, or any other lung disease?  No    Do you have any heart conditions?  No     Have you ever had or are you waiting for an organ transplant?  No. Continue scheduling, no site restrictions.    Have you had a stroke or transient ischemic attack (TIA aka \"mini stroke\" in the last 6 months?   No    Have you been diagnosed with or been told you have cirrhosis of the liver?   No    Are you currently on dialysis?   No    Do you need assistance transferring?   No    BMI: Estimated body mass index is 28.7 kg/m  as calculated from the following:    Height as of 7/25/24: 1.466 m (4' 9.72\").    Weight as of 7/25/24: 61.7 kg (136 lb).     Is patients BMI > 40 and scheduling location [facility]?  No    Do " you take an injectable medication for weight loss or diabetes (excluding insulin)?  No    Do you take the medication Naltrexone?  No    Do you take blood thinners?  No       Prep   Are you currently on dialysis or do you have chronic kidney disease?  No    Do you have a diagnosis of diabetes?  No    Do you have a diagnosis of cystic fibrosis (CF)?  No    On a regular basis do you go 3 -5 days between bowel movements?  No    BMI > 40?  No    Preferred Pharmacy:    [facility] - [address] [address]  Phone: [phone] Fax: [phone]          Final Scheduling Details     Procedure scheduled  Colonoscopy    Surgeon:  Shayy     Date of procedure:  10/24     Pre-OP / PAC:   No - Not required for this site.    Location  [facility] - Patient preference.    Sedation   MAC/Deep Sedation  site      Patient Reminders:   You will receive a call from a Nurse to review instructions and health history.  This assessment must be completed prior to your procedure.  Failure to complete the Nurse assessment may result in the procedure being cancelled.      On the day of your procedure, please designate an adult(s) who can drive you home stay with you for the next 24 hours. The medicines used in the exam will make you sleepy. You will not be able to drive.      You cannot take public transportation, ride share services, or non-medical taxi service without a responsible caregiver.  Medical transport services are allowed with the requirement that a responsible caregiver will receive you at your destination.  We require that drivers and caregivers are confirmed prior to your procedure.

## 2024-09-23 DIAGNOSIS — Z12.11 SPECIAL SCREENING FOR MALIGNANT NEOPLASMS, COLON: Primary | ICD-10-CM

## 2024-10-17 DIAGNOSIS — J45.21 MILD INTERMITTENT ASTHMA WITH ACUTE EXACERBATION: ICD-10-CM

## 2024-10-17 RX ORDER — ALBUTEROL SULFATE 0.83 MG/ML
SOLUTION RESPIRATORY (INHALATION)
Qty: 90 ML | Refills: 11 | Status: SHIPPED | OUTPATIENT
Start: 2024-10-17

## 2024-10-22 ENCOUNTER — ANESTHESIA EVENT (OUTPATIENT)
Dept: SURGERY | Facility: AMBULATORY SURGERY CENTER | Age: 54
End: 2024-10-22
Payer: COMMERCIAL

## 2024-10-24 ENCOUNTER — HOSPITAL ENCOUNTER (OUTPATIENT)
Facility: AMBULATORY SURGERY CENTER | Age: 54
End: 2024-10-24
Attending: FAMILY MEDICINE
Payer: COMMERCIAL

## 2024-10-24 ENCOUNTER — ANESTHESIA (OUTPATIENT)
Dept: SURGERY | Facility: AMBULATORY SURGERY CENTER | Age: 54
End: 2024-10-24
Payer: COMMERCIAL

## 2024-10-24 ENCOUNTER — TELEPHONE (OUTPATIENT)
Dept: GASTROENTEROLOGY | Facility: CLINIC | Age: 54
End: 2024-10-24
Payer: COMMERCIAL

## 2024-10-24 RX ORDER — LIDOCAINE 40 MG/G
CREAM TOPICAL
Status: DISCONTINUED | OUTPATIENT
Start: 2024-10-24 | End: 2025-01-25 | Stop reason: HOSPADM

## 2024-10-24 RX ORDER — SODIUM CHLORIDE, SODIUM LACTATE, POTASSIUM CHLORIDE, CALCIUM CHLORIDE 600; 310; 30; 20 MG/100ML; MG/100ML; MG/100ML; MG/100ML
INJECTION, SOLUTION INTRAVENOUS CONTINUOUS
Status: DISCONTINUED | OUTPATIENT
Start: 2024-10-24 | End: 2025-01-25 | Stop reason: HOSPADM

## 2024-10-24 NOTE — TELEPHONE ENCOUNTER
Caller: Pt's son Zo    Reason for Reschedule/Cancellation   (please be detailed, any staff messages or encounters to note?): Pre-Assessment not completed      Prior to reschedule please review:  [facility]ing Provider[facility]  Sedation Determined: MAC  Does patient have any ASC Exclusions, please identify?:       Notes on Cancelled Procedure:  Procedure: Lower Endoscopy [Colonoscopy]   Date: 10.24.24  Location: [facility]; [address] [place] [address], [address] [address]  Surgeon: Malcom      Rescheduled: Yes,   Procedure: Lower Endoscopy [Colonoscopy]    Date: 1.6.25   Location: [facility]; [address] [place] [address], [address], [place] [address]   Surgeon: Dinora   Sedation Level Scheduled  MAC ,  Reason for Sedation Level Location   Instructions updated and sent: Letter     Does patient need PAC or Pre -Op Rescheduled? : No       Did you cancel or rescheduled an EUS procedure? No.

## 2024-11-18 ENCOUNTER — PATIENT OUTREACH (OUTPATIENT)
Dept: CARE COORDINATION | Facility: CLINIC | Age: 54
End: 2024-11-18
Payer: COMMERCIAL

## 2024-12-02 ENCOUNTER — PATIENT OUTREACH (OUTPATIENT)
Dept: CARE COORDINATION | Facility: CLINIC | Age: 54
End: 2024-12-02
Payer: COMMERCIAL

## 2025-01-02 ENCOUNTER — ANESTHESIA EVENT (OUTPATIENT)
Dept: SURGERY | Facility: AMBULATORY SURGERY CENTER | Age: 55
End: 2025-01-02
Payer: COMMERCIAL

## 2025-01-02 NOTE — OR NURSING
"Upon patient interview assessment, patient reports using inhaler and nebulizer recently- was kind of uncertain of exact date-states he has had a bit of coughing and some wheezing last Friday- \"its my asthma\". Patient reports this is very similar to his baseline and sometimes the cold weather bothers it.     RN told patient that if he started to feel ill, increased coughing/wheezing or started to run a fever that we would need to be called and most likely he would need to be rescheduled.     He also has history of hiatal hernia and does not use prescribed Omperazole.       "

## 2025-01-06 ENCOUNTER — ANESTHESIA (OUTPATIENT)
Dept: SURGERY | Facility: AMBULATORY SURGERY CENTER | Age: 55
End: 2025-01-06
Payer: COMMERCIAL

## 2025-01-06 ENCOUNTER — HOSPITAL ENCOUNTER (OUTPATIENT)
Facility: AMBULATORY SURGERY CENTER | Age: 55
Discharge: HOME | End: 2025-01-06
Attending: SURGERY
Payer: COMMERCIAL

## 2025-01-06 VITALS
DIASTOLIC BLOOD PRESSURE: 56 MMHG | RESPIRATION RATE: 16 BRPM | BODY MASS INDEX: 26.7 KG/M2 | TEMPERATURE: 97.1 F | OXYGEN SATURATION: 97 % | SYSTOLIC BLOOD PRESSURE: 106 MMHG | HEART RATE: 77 BPM | HEIGHT: 60 IN | WEIGHT: 136 LBS

## 2025-01-06 DIAGNOSIS — Z12.11 SPECIAL SCREENING FOR MALIGNANT NEOPLASMS, COLON: ICD-10-CM

## 2025-01-06 LAB — COLONOSCOPY: NORMAL

## 2025-01-06 RX ORDER — DEXAMETHASONE SODIUM PHOSPHATE 4 MG/ML
4 INJECTION, SOLUTION INTRA-ARTICULAR; INTRALESIONAL; INTRAMUSCULAR; INTRAVENOUS; SOFT TISSUE
Status: DISCONTINUED | OUTPATIENT
Start: 2025-01-06 | End: 2025-01-07 | Stop reason: HOSPADM

## 2025-01-06 RX ORDER — PROPOFOL 10 MG/ML
INJECTION, EMULSION INTRAVENOUS PRN
Status: DISCONTINUED | OUTPATIENT
Start: 2025-01-06 | End: 2025-01-06

## 2025-01-06 RX ORDER — ACETAMINOPHEN 325 MG/1
975 TABLET ORAL ONCE
Status: DISCONTINUED | OUTPATIENT
Start: 2025-01-06 | End: 2025-01-07 | Stop reason: HOSPADM

## 2025-01-06 RX ORDER — ONDANSETRON 4 MG/1
4 TABLET, ORALLY DISINTEGRATING ORAL EVERY 30 MIN PRN
Status: DISCONTINUED | OUTPATIENT
Start: 2025-01-06 | End: 2025-01-07 | Stop reason: HOSPADM

## 2025-01-06 RX ORDER — OXYCODONE HYDROCHLORIDE 10 MG/1
10 TABLET ORAL EVERY 4 HOURS PRN
Status: DISCONTINUED | OUTPATIENT
Start: 2025-01-06 | End: 2025-01-07 | Stop reason: HOSPADM

## 2025-01-06 RX ORDER — LIDOCAINE HYDROCHLORIDE 20 MG/ML
INJECTION, SOLUTION INFILTRATION; PERINEURAL PRN
Status: DISCONTINUED | OUTPATIENT
Start: 2025-01-06 | End: 2025-01-06

## 2025-01-06 RX ORDER — LIDOCAINE 40 MG/G
CREAM TOPICAL
Status: DISCONTINUED | OUTPATIENT
Start: 2025-01-06 | End: 2025-01-07 | Stop reason: HOSPADM

## 2025-01-06 RX ORDER — PROPOFOL 10 MG/ML
INJECTION, EMULSION INTRAVENOUS CONTINUOUS PRN
Status: DISCONTINUED | OUTPATIENT
Start: 2025-01-06 | End: 2025-01-06

## 2025-01-06 RX ORDER — NALOXONE HYDROCHLORIDE 0.4 MG/ML
0.1 INJECTION, SOLUTION INTRAMUSCULAR; INTRAVENOUS; SUBCUTANEOUS
Status: DISCONTINUED | OUTPATIENT
Start: 2025-01-06 | End: 2025-01-07 | Stop reason: HOSPADM

## 2025-01-06 RX ORDER — OXYCODONE HYDROCHLORIDE 5 MG/1
5 TABLET ORAL EVERY 4 HOURS PRN
Status: DISCONTINUED | OUTPATIENT
Start: 2025-01-06 | End: 2025-01-07 | Stop reason: HOSPADM

## 2025-01-06 RX ORDER — ONDANSETRON 2 MG/ML
4 INJECTION INTRAMUSCULAR; INTRAVENOUS EVERY 30 MIN PRN
Status: DISCONTINUED | OUTPATIENT
Start: 2025-01-06 | End: 2025-01-07 | Stop reason: HOSPADM

## 2025-01-06 RX ORDER — FENTANYL CITRATE 0.05 MG/ML
50 INJECTION, SOLUTION INTRAMUSCULAR; INTRAVENOUS
Status: DISCONTINUED | OUTPATIENT
Start: 2025-01-06 | End: 2025-01-07 | Stop reason: HOSPADM

## 2025-01-06 RX ADMIN — PROPOFOL 160 MCG/KG/MIN: 10 INJECTION, EMULSION INTRAVENOUS at 08:13

## 2025-01-06 RX ADMIN — PROPOFOL 50 MG: 10 INJECTION, EMULSION INTRAVENOUS at 08:13

## 2025-01-06 RX ADMIN — LIDOCAINE HYDROCHLORIDE 2.5 ML: 20 INJECTION, SOLUTION INFILTRATION; PERINEURAL at 08:13

## 2025-01-06 RX ADMIN — SODIUM CHLORIDE, SODIUM LACTATE, POTASSIUM CHLORIDE, CALCIUM CHLORIDE: 600; 310; 30; 20 INJECTION, SOLUTION INTRAVENOUS at 08:11

## 2025-01-06 ASSESSMENT — ENCOUNTER SYMPTOMS: ORTHOPNEA: 1

## 2025-01-06 NOTE — ANESTHESIA CARE TRANSFER NOTE
Patient: Mary Arguello    Procedure: Procedure(s):  COLONOSCOPY, polypectomy       Diagnosis: Special screening for malignant neoplasms, colon [Z12.11]  Diagnosis Additional Information: No value filed.    Anesthesia Type:   MAC     Note:    Oropharynx: oropharynx clear of all foreign objects  Level of Consciousness: drowsy  Oxygen Supplementation: face mask  Level of Supplemental Oxygen (L/min / FiO2): 6  Independent Airway: airway patency satisfactory and stable  Dentition: dentition unchanged  Vital Signs Stable: post-procedure vital signs reviewed and stable  Report to RN Given: handoff report given  Patient transferred to: Phase II    Handoff Report: Identifed the Patient, Identified the Reponsible Provider, Reviewed the pertinent medical history, Discussed the surgical course, Reviewed Intra-OP anesthesia mangement and issues during anesthesia, Set expectations for post-procedure period and Allowed opportunity for questions and acknowledgement of understanding      Vitals:  Vitals Value Taken Time   /68 01/06/25 0915   Temp 97.1  F (36.2  C) 01/06/25 0915   Pulse 75 01/06/25 0916   Resp 16 01/06/25 0915   SpO2 98 % 01/06/25 0916   Vitals shown include unfiled device data.    Electronically Signed By: SONAL Martinez CRNA  January 6, 2025  9:18 AM

## 2025-01-06 NOTE — ANESTHESIA PREPROCEDURE EVALUATION
Anesthesia Pre-Procedure Evaluation    Patient: Mary Arguello   MRN: [id number] : 1970        Procedure : Procedure(s):  COLONOSCOPY          Past Medical History:   Diagnosis Date    Acute respiratory failure with hypoxia (H)     Arrhythmia     Community acquired pneumonia of right lower lobe of lung     Dyspnea on exertion     Fibrothorax     Scarring left lung    Gastroesophageal reflux disease     Hiatal hernia     Orthopnea     TB lung, latent 1985    Uncomplicated asthma       Past Surgical History:   Procedure Laterality Date    NO PAST SURGERIES        Allergies   Allergen Reactions    Aleve [Naproxen] Itching      Social History     Tobacco Use    Smoking status: Never     Passive exposure: Never    Smokeless tobacco: Never    Tobacco comments:     no passive exposure   Substance Use Topics    Alcohol use: No      Wt Readings from Last 1 Encounters:   25 61.7 kg (136 lb)        Anesthesia Evaluation   Pt has had prior anesthetic.         ROS/MED HX  ENT/Pulmonary:     (+)                      asthma                  Neurologic:       Cardiovascular:     (+)  - -   -  - -           STACK. orthopnea/PND,                          METS/Exercise Tolerance:     Hematologic:       Musculoskeletal:       GI/Hepatic:     (+) GERD,     hiatal hernia,              Renal/Genitourinary:       Endo:       Psychiatric/Substance Use:       Infectious Disease:       Malignancy:       Other:            Physical Exam    Airway        Mallampati: II    Neck ROM: full     Respiratory Devices and Support         Dental       (+) Minor Abnormalities - some fillings, tiny chips      Cardiovascular   cardiovascular exam normal          Pulmonary   pulmonary exam normal                OUTSIDE LABS:  CBC:   Lab Results   Component Value Date    WBC 8.7 2023    WBC 7.8 2023    HGB 16.0 2023    HGB 16.2 2023    HCT 48.3 2023    HCT 48.7 2023     2023     2023  "    BMP:   Lab Results   Component Value Date     09/27/2023     10/26/2022    POTASSIUM 4.1 09/27/2023    POTASSIUM 3.5 10/26/2022    CHLORIDE 106 09/27/2023    CHLORIDE 106 10/26/2022    CO2 22 09/27/2023    CO2 23 10/26/2022    BUN 18.5 09/27/2023    BUN 15.4 10/26/2022    CR 0.70 09/27/2023    CR 0.72 10/26/2022    GLC 75 09/27/2023    GLC 97 10/26/2022     COAGS: No results found for: \"PTT\", \"INR\", \"FIBR\"  POC: No results found for: \"BGM\", \"HCG\", \"HCGS\"  HEPATIC:   Lab Results   Component Value Date    ALBUMIN 4.3 10/16/2019    PROTTOTAL 7.3 10/16/2019    ALT 49 (H) 10/16/2019    AST 63 (H) 10/16/2019    ALKPHOS 85 10/16/2019    BILITOTAL 1.5 (H) 10/16/2019     OTHER:   Lab Results   Component Value Date    LACT 1.4 10/16/2019    A1C 5.9 (H) 12/18/2023    ANA 9.4 09/27/2023    MAG 2.3 11/29/2018    TSH 3.57 11/28/2018    CRP <0.1 11/28/2018       Anesthesia Plan    ASA Status:  2       Anesthesia Type: MAC.              Consents    Anesthesia Plan(s) and associated risks, benefits, and realistic alternatives discussed. Questions answered and patient/representative(s) expressed understanding.     - Discussed: Risks, Benefits and Alternatives for the PROCEDURE were discussed     - Discussed with:  Patient      - Extended Intubation/Ventilatory Support Discussed: No.      - Patient is DNR/DNI Status: No     Use of blood products discussed: No .     Postoperative Care            Comments:               Greta Edwards MD    I have reviewed the pertinent notes and labs in the chart from the past 30 days and (re)examined the patient.  Any updates or changes from those notes are reflected in this note.                         # Overweight: Estimated body mass index is 28.67 kg/m  as calculated from the following:    Height as of this encounter: 1.467 m (4' 9.75\").    Weight as of this encounter: 61.7 kg (136 lb).       # Asthma: noted on problem list       "

## 2025-01-06 NOTE — DISCHARGE INSTRUCTIONS
If you have any questions or concerns regarding your procedure please contact Dr. Farias, his office number is [phone].    Discharge Instructions:    Take you medications as directed and follow up with you primary provider as scheduled.   You should expect to pass air from your rectum after the procedure.   Follow these care guidelines during your recovery for the next 24 hours.   If you have any questions or concerns please contact the provider that performed your procedure.     You were given medicine for sedation:  You have been given medicines during your procedure that might make you sleepy and weak. To prevent problems:    *Rest for the rest of the day after you are home. You should be back to your normal activity tomorrow.  *For the next 24 hours:   -Do not drink alcoholic beverages.   -Do not make any important decisions or sign any legal forms.   -Do not work around machinery or power equipment.     The medicines used for sedation may make you feel nauseated.   *Start with clear liquids, such as tea, jell-o, broth and ginger ale. As you feel better you may add soft foods such as pudding and ice cream.   *When you no longer feel nauseated, you may try your normal diet.     You should be back to eating your normal after 24 hours.     Call if you have any of these problems:  *Fever of 101 degree F or 38 degrees C  *Bleeding from the rectum  *Black stool or blood in your bowel movements  *Nausea with vomiting that does not ease after a few hours.  *Abdominal pain or bloating  *Fainting

## 2025-01-06 NOTE — H&P
PRE PROCEDURE NOTE - H & P      Chief Complaint/Reason for Procedure:              Screening colonoscopy    Current Outpatient Medications   Medication Sig Dispense Refill    albuterol (PROAIR HFA/PROVENTIL HFA/VENTOLIN HFA) 108 (90 Base) MCG/ACT inhaler Inhale 2 puffs into the lungs every 4 hours as needed for shortness of breath, wheezing or cough 18 g 11    fluticasone-salmeterol (ADVAIR DISKUS) 250-50 MCG/ACT inhaler Inhale 1 puff into the lungs 2 times daily (Or any covered similar per pharmacist) 1 each 11    albuterol (PROVENTIL) (2.5 MG/3ML) 0.083% neb solution NEBULIZE 1 VIAL EVERY 4 HOURS AS NEEDED FOR WHEEZING. 90 mL 11    hydrocortisone (ANUSOL-HC) 25 MG suppository Place 1 suppository (25 mg) rectally 2 times daily 12 suppository 1    omeprazole (PRILOSEC) 20 MG DR capsule TAKE 1 CAPSULE 30 MINUTES BEFORE FIRST MEAL DAILY FOR STOMACH // 1 HNUB NOJ 1 LUB UA NTEJ NOJ TSHAIS PAB ALE MOB PLAB/MOB NCAUJ PLAB 90 capsule 3     Current Facility-Administered Medications   Medication Dose Route Frequency Provider Last Rate Last Admin    lidocaine (LMX4) kit   Topical Q1H PRN Dylan Wood MD        lidocaine 1 % 0.1-1 mL  0.1-1 mL Other Q1H PRN Dylan Wood MD        sodium chloride (PF) 0.9% PF flush 3 mL  3 mL Intracatheter Q8H Dylan Wood MD        sodium chloride (PF) 0.9% PF flush 3 mL  3 mL Intracatheter q1 min prn Dylan Wood MD         Facility-Administered Medications Ordered in Other Encounters   Medication Dose Route Frequency Provider Last Rate Last Admin    lactated ringers infusion   Intravenous Continuous Vera Menjivar MD   Held at 10/24/24 1140    lidocaine (LMX4) kit   Topical Q1H PRN Vera Menjivar MD        lidocaine 1 % 0.1-1 mL  0.1-1 mL Other Q1H PRN Vera Menjivar MD        sodium chloride (PF) 0.9% PF flush 3 mL  3 mL Intracatheter Q8H Vera Menjivar MD        sodium chloride (PF) 0.9% PF flush 3 mL  3 mL Intracatheter q1 min  "donald Menjivar, Vera Mabry MD              Allergies   Allergen Reactions    Aleve [Naproxen] Itching       Past Medical History:   Diagnosis Date    Acute respiratory failure with hypoxia (H)     Arrhythmia     Community acquired pneumonia of right lower lobe of lung     Dyspnea on exertion     Fibrothorax     Scarring left lung    Gastroesophageal reflux disease     Hiatal hernia     Orthopnea     TB lung, latent 1985    Uncomplicated asthma        Past Surgical History:   Procedure Laterality Date    NO PAST SURGERIES         Pre-sedation assessment:            /88   Temp 96.8  F (36  C) (Temporal)   Resp 18   Ht 1.467 m (4' 9.75\")   Wt 61.7 kg (136 lb)   SpO2 95%   BMI 28.67 kg/m    General appearance: alert, appears stated age, and cooperative  Airway: No gross abnormalities appreciated that would increase risk of airway compromise.   Heart: Regular rate and rhythm  Lungs: Normal respiratory effort    ASA Classification: 2    Sedation Plan based on assessment: Moderate    Impression:  Patient deemed adequate candidate for moderate sedation         Plan:   colonoscopy      Edi Farias MD  1/6/2025 8:03 AM  [phone]                              Screening colonoscopy  "

## 2025-01-06 NOTE — ANESTHESIA POSTPROCEDURE EVALUATION
Patient: Mary Arguello    Procedure: Procedure(s):  COLONOSCOPY, polypectomy       Anesthesia Type:  MAC    Note:  Disposition: Outpatient   Postop Pain Control: Uneventful            Sign Out: Well controlled pain   PONV: No   Neuro/Psych: Uneventful            Sign Out: Acceptable/Baseline neuro status   Airway/Respiratory: Uneventful            Sign Out: Acceptable/Baseline resp. status   CV/Hemodynamics: Uneventful            Sign Out: Acceptable CV status; No obvious hypovolemia; No obvious fluid overload   Other NRE: NONE   DID A NON-ROUTINE EVENT OCCUR? No           Last vitals:  Vitals Value Taken Time   /66 01/06/25 0943   Temp 97.1  F (36.2  C) 01/06/25 0915   Pulse 74 01/06/25 0942   Resp 16 01/06/25 0915   SpO2 96 % 01/06/25 0942   Vitals shown include unfiled device data.    Electronically Signed By: Greta Edwards MD  January 6, 2025  11:36 AM

## 2025-01-09 DIAGNOSIS — J45.20 MILD INTERMITTENT ASTHMA WITHOUT COMPLICATION: ICD-10-CM

## 2025-01-09 RX ORDER — FLUTICASONE PROPIONATE AND SALMETEROL 50; 250 UG/1; UG/1
POWDER RESPIRATORY (INHALATION)
Qty: 60 EACH | Refills: 11 | Status: SHIPPED | OUTPATIENT
Start: 2025-01-09

## 2025-01-09 RX ORDER — ALBUTEROL SULFATE 90 UG/1
AEROSOL, METERED RESPIRATORY (INHALATION)
Qty: 18 G | Refills: 11 | Status: SHIPPED | OUTPATIENT
Start: 2025-01-09

## 2025-02-14 ENCOUNTER — OFFICE VISIT (OUTPATIENT)
Dept: FAMILY MEDICINE | Facility: CLINIC | Age: 55
End: 2025-02-14
Payer: COMMERCIAL

## 2025-02-14 VITALS
SYSTOLIC BLOOD PRESSURE: 115 MMHG | HEIGHT: 60 IN | TEMPERATURE: 97.8 F | HEART RATE: 93 BPM | OXYGEN SATURATION: 96 % | DIASTOLIC BLOOD PRESSURE: 76 MMHG | BODY MASS INDEX: 26.11 KG/M2 | RESPIRATION RATE: 16 BRPM | WEIGHT: 133 LBS

## 2025-02-14 DIAGNOSIS — J45.20 MILD INTERMITTENT ASTHMA WITHOUT COMPLICATION: ICD-10-CM

## 2025-02-14 PROCEDURE — 99214 OFFICE O/P EST MOD 30 MIN: CPT | Performed by: FAMILY MEDICINE

## 2025-02-14 RX ORDER — PREDNISONE 20 MG/1
20 TABLET ORAL DAILY
Qty: 7 TABLET | Refills: 0 | Status: SHIPPED | OUTPATIENT
Start: 2025-02-14 | End: 2025-02-21

## 2025-02-14 RX ORDER — FLUTICASONE PROPIONATE AND SALMETEROL 50; 250 UG/1; UG/1
1 POWDER RESPIRATORY (INHALATION) 2 TIMES DAILY
Qty: 60 EACH | Refills: 11 | Status: SHIPPED | OUTPATIENT
Start: 2025-02-14

## 2025-02-14 ASSESSMENT — ASTHMA QUESTIONNAIRES
QUESTION_2 LAST FOUR WEEKS HOW OFTEN HAVE YOU HAD SHORTNESS OF BREATH: MORE THAN ONCE A DAY
QUESTION_5 LAST FOUR WEEKS HOW WOULD YOU RATE YOUR ASTHMA CONTROL: NOT CONTROLLED AT ALL
QUESTION_4 LAST FOUR WEEKS HOW OFTEN HAVE YOU USED YOUR RESCUE INHALER OR NEBULIZER MEDICATION (SUCH AS ALBUTEROL): THREE OR MORE TIMES PER DAY
ACT_TOTALSCORE: 5
ACT_TOTALSCORE: 5
QUESTION_3 LAST FOUR WEEKS HOW OFTEN DID YOUR ASTHMA SYMPTOMS (WHEEZING, COUGHING, SHORTNESS OF BREATH, CHEST TIGHTNESS OR PAIN) WAKE YOU UP AT NIGHT OR EARLIER THAN USUAL IN THE MORNING: FOUR OR MORE NIGHTS A WEEK
QUESTION_1 LAST FOUR WEEKS HOW MUCH OF THE TIME DID YOUR ASTHMA KEEP YOU FROM GETTING AS MUCH DONE AT WORK, SCHOOL OR AT HOME: ALL OF THE TIME

## 2025-02-14 ASSESSMENT — PATIENT HEALTH QUESTIONNAIRE - PHQ9
SUM OF ALL RESPONSES TO PHQ QUESTIONS 1-9: 18
SUM OF ALL RESPONSES TO PHQ QUESTIONS 1-9: 18
10. IF YOU CHECKED OFF ANY PROBLEMS, HOW DIFFICULT HAVE THESE PROBLEMS MADE IT FOR YOU TO DO YOUR WORK, TAKE CARE OF THINGS AT HOME, OR GET ALONG WITH OTHER PEOPLE: VERY DIFFICULT

## 2025-02-14 NOTE — PROGRESS NOTES
"  Assessment & Plan     Mild intermittent asthma without complication    Not controlled without advair.    Prednisone for 1 week.      - ADVAIR DISKUS 250-50 MCG/ACT inhaler  Dispense: 60 each; Refill: 11  - predniSONE (DELTASONE) 20 MG tablet  Dispense: 7 tablet; Refill: 0      Prescription drug management        BMI  Estimated body mass index is 28.19 kg/m  as calculated from the following:    Height as of this encounter: 1.463 m (4' 9.6\").    Weight as of this encounter: 60.3 kg (133 lb).             Phillip Hernandez is a 54 year old, presenting for the following health issues:  Asthma (Asthma symptoms getting worse. Symptoms getting worse when he is laying down or up and moving around. ) and Recheck Medication      2/14/2025    10:54 AM   Additional Questions   Roomed by opal lowery   Accompanied by self     History of Present Illness     Asthma:  He presents for follow up of asthma.  He has no cough, some wheezing, and some shortness of breath.  He is using a relief medication daily. He does not miss any doses of his controller medication throughout the week. Patient is aware of the following triggers: same as previous visit. The patient has had a visit to the Emergency Room, Urgent Care or Hospital due to asthma since the last clinic visit. He has been to the Emergency Room or Urgent Care 0 times.He has had a Hospitalization 0 times.    He eats 0-1 servings of fruits and vegetables daily.He consumes 1 sweetened beverage(s) daily.He exercises with enough effort to increase his heart rate 60 or more minutes per day.  He exercises with enough effort to increase his heart rate 4 days per week.   He is taking medications regularly.     He has not had Advair for 3 months.    Has had more symptoms for 1 month.  No fever, no sputum production.    Current Outpatient Medications   Medication Sig Dispense Refill    ADVAIR DISKUS 250-50 MCG/ACT inhaler Inhale 1 puff into the lungs 2 times daily. 60 each 11    albuterol " "(PROVENTIL) (2.5 MG/3ML) 0.083% neb solution NEBULIZE 1 VIAL EVERY 4 HOURS AS NEEDED FOR WHEEZING. 90 mL 11    VENTOLIN  (90 Base) MCG/ACT inhaler INHALE 2 PUFFS EVERY 4 HOURS AS NEEDED FOR WHEEZING OR SHORTNESS OF BREATH.//IB ZAUG NQUS 2 PAS, 4 TEEV SIV IB ZAUG YOG HAWB POB. 18 g 11    hydrocortisone (ANUSOL-HC) 25 MG suppository Place 1 suppository (25 mg) rectally 2 times daily (Patient not taking: Reported on 2/14/2025) 12 suppository 1    omeprazole (PRILOSEC) 20 MG DR capsule TAKE 1 CAPSULE 30 MINUTES BEFORE FIRST MEAL DAILY FOR STOMACH // 1 HNUB NOJ 1 LUB UA NTEJ NOJ TSHAIS PAB ALE MOB PLAB/MOB NCAUJ PLAB (Patient not taking: Reported on 2/14/2025) 90 capsule 3                   Objective    /76 (BP Location: Left arm, Patient Position: Sitting, Cuff Size: Adult Regular)   Pulse 93   Temp 97.8  F (36.6  C) (Oral)   Resp 16   Ht 1.463 m (4' 9.6\")   Wt 60.3 kg (133 lb)   SpO2 96%   BMI 28.19 kg/m    Body mass index is 28.19 kg/m .  Physical Exam     Heart normal  Lungs: mild scattered wheezes          Signed Electronically by: Seven Franklin MD    Answers submitted by the patient for this visit:  Patient Health Questionnaire (Submitted on 2/14/2025)  If you checked off any problems, how difficult have these problems made it for you to do your work, take care of things at home, or get along with other people?: Very difficult  PHQ9 TOTAL SCORE: 18    "

## 2025-03-06 ENCOUNTER — NURSE TRIAGE (OUTPATIENT)
Dept: FAMILY MEDICINE | Facility: CLINIC | Age: 55
End: 2025-03-06
Payer: COMMERCIAL

## 2025-03-06 DIAGNOSIS — J45.20 MILD INTERMITTENT ASTHMA WITHOUT COMPLICATION: ICD-10-CM

## 2025-03-06 RX ORDER — PREDNISONE 20 MG/1
TABLET ORAL
Qty: 7 TABLET | Refills: 0 | OUTPATIENT
Start: 2025-03-06

## 2025-03-06 NOTE — TELEPHONE ENCOUNTER
It looks like the patient may be requesting refill on prednisone for asthma problems, please triage.  Thank you.

## 2025-03-06 NOTE — TELEPHONE ENCOUNTER
Nurse Triage SBAR    Is this a 2nd Level Triage? YES, LICENSED PRACTITIONER REVIEW IS REQUIRED    Situation: Asthma symptoms flare up.  Patient was seen by Dr. Franklin 2/14 with Prednisone prescribed to manage asthma symptoms and had been effective.  Patient does not want an appointment.  Requesting refill of prednisone medication.    Background: Asthma flare up.     Assessment: Asthma symptoms worsening at night affecting work of breathing. Nebulizer uses more than frequently some nights to get through to the night.  Prednisone prescribed from last provider visit has helped.      Protocol Recommended Disposition:   No disposition on file.    Recommendation: Offered an appt.  Patient refused.      Routed to provider    Does the patient meet one of the following criteria for ADS visit consideration? No    Can leave a detail message with provider recommendation if no answer on phone at follow up.        Additional Information   Negative: SEVERE difficulty breathing (e.g., struggling for each breath, speaks in single words, pulse > 120)   Negative: Breathing stopped and hasn't returned   Negative: Choking on something   Negative: Bluish (or gray) lips or face   Negative: Difficult to awaken or acting confused (e.g., disoriented, slurred speech)   Negative: Passed out (e.g., fainted, lost consciousness, blacked out and was not responding)   Negative: Wheezing started suddenly after medicine, an allergic food, or bee sting   Negative: Stridor (harsh sound while breathing in)   Negative: Slow, shallow and weak breathing   Negative: Sounds like a life-threatening emergency to the triager   Negative: Chest pain   Negative: Wheezing (high pitched whistling sound) and previous asthma attacks or use of asthma medicines   Negative: Breathing difficulty and within 14 days of COVID-19 EXPOSURE (close contact) with someone diagnosed with COVID-19 (e.g., COVID test positive)   Negative: Breathing difficulty and COVID-19 is  Patient seen and examined. Medically clear to discharge home today. Echo unremarkable. ST today. Syncope likely due to dehydration. Now hydrated. If ST neg can d/c home. No diuretics on d/c.       Grisel Gutierrez MD "widespread in the community   Negative: Breathing diffculty and only present when coughing   Negative: Breathing difficulty and only from stuffy nose   Negative: Breathing diffculty and only from stuffy nose or runny nose from common cold   Negative: MODERATE difficulty breathing (e.g., speaks in phrases, SOB even at rest, pulse 100-120) of new-onset or worse than normal   Negative: Oxygen level (e.g., pulse oximetry) 90% or lower   Negative: Wheezing can be heard across the room   Negative: Drooling or spitting out saliva (because can't swallow)   Negative: Any history of prior \"blood clot\" in leg or lungs   Negative: Illness requiring prolonged bedrest in past month (e.g., immobilization, long hospital stay)   Negative: Hip or leg fracture (broken bone) in past month (or had cast on leg or ankle in past month)   Negative: Major surgery in the past month   Negative: Long-distance travel in past month (e.g., car, bus, train, plane; with trip lasting 6 or more hours)   Negative: Cancer treatment in past six months (or has cancer now)   Negative: Extra heartbeats, irregular heart beating, or heart is beating very fast (i.e., 'palpitations')   Negative: Fever > 103 F (39.4 C)   Negative: Fever > 101 F (38.3 C) and over 60 years of age   Negative: Fever > 100 F (37.8 C) and bedridden (e.g., nursing home patient, stroke, chronic illness, recovering from surgery)   Negative: Fever > 100 F (37.8 C) and diabetes mellitus or weak immune system (e.g., HIV positive, cancer chemo, splenectomy, organ transplant, chronic steroids)   Negative: Periods where breathing stops and then resumes normally and bedridden (e.g., nursing home patient, CVA)   Negative: Pregnant or postpartum (from 0 to 6 weeks after delivery)   Negative: Patient sounds very sick or weak to the triager    Answer Assessment - Initial Assessment Questions  1. RESPIRATORY STATUS: \"Describe your breathing?\" (e.g., wheezing, shortness of breath, unable to speak, " "severe coughing)       Wheezing and short of breath in the night.  Patient reported walking and cold air worsening symptoms.  Breathing normally if walking slow and not have to perform heavy labor.    2. ONSET: \"When did this breathing problem begin?\"       Asthma flare up occurred more frequently.     3. PATTERN \"Does the difficult breathing come and go, or has it been constant since it started?\"       Constantly at night.  Patient reported have to use the nebulizer more frequently in evening.     4. SEVERITY: \"How bad is your breathing?\" (e.g., mild, moderate, severe)       Mild at time of nurse triage.    5. RECURRENT SYMPTOM: \"Have you had difficulty breathing before?\" If Yes, ask: \"When was the last time?\" and \"What happened that time?\"       Patient reported breathing worsening at night. Lay on right sided of the body worsening breathing.    6. CARDIAC HISTORY: \"Do you have any history of heart disease?\" (e.g., heart attack, angina, bypass surgery, angioplasty)       Hx of chest pain per chart reviewed. Patient reported resolved.    7. LUNG HISTORY: \"Do you have any history of lung disease?\"  (e.g., pulmonary embolus, asthma, emphysema)      Asthma    8. CAUSE: \"What do you think is causing the breathing problem?\"       Uncertain.  Weather is a factor.    9. OTHER SYMPTOMS: \"Do you have any other symptoms?\" (e.g., chest pain, cough, dizziness, fever, runny nose)      Denied.    10. O2 SATURATION MONITOR:  \"Do you use an oxygen saturation monitor (pulse oximeter) at home?\" If Yes, ask: \"What is your reading (oxygen level) today?\" \"What is your usual oxygen saturation reading?\" (e.g., 95%)        N/a    11. PREGNANCY: \"Is there any chance you are pregnant?\" \"When was your last menstrual period?\"        N/a    12. TRAVEL: \"Have you traveled out of the country in the last month?\" (e.g., travel history, exposures)        Denied.    Protocols used: Breathing Difficulty-A-[place]    "

## 2025-03-06 NOTE — TELEPHONE ENCOUNTER
"Writer called patient in attempt to triage patient based on reported symptoms of \"Refill request for Prednisone related to Asthma symptoms\". No answer, left VM with call back number.    If patient returns call back, please transfer call to an available RN to triage patient for reported symptoms. Thanks.    Jacque Rosenbaum, BSN, RN, PHN   [facility]      "

## 2025-03-06 NOTE — TELEPHONE ENCOUNTER
I would advise that patient is seen to be evaluated- he has chronic lung conditions and if prednisone did not improve his symptoms after 7 days, he should be re-evaluated, possible imaging, etc.

## 2025-03-07 NOTE — TELEPHONE ENCOUNTER
Provider Recommendation Follow Up:   Unable to reach patient/caregiver. Left detailed message to return call to [phone]. Upon return call please notify caller of provider's recommendations.      HAMMAD LeongN, RN, PHN   [facility]

## 2025-03-10 NOTE — TELEPHONE ENCOUNTER
RN reached via telephone call.  Patient agreed to a in-person office follow up.  Date, time, provider to be seen and location provided.  Patient reported symptoms remain unchanged.    Eddy Stout RN  [facility]

## 2025-03-11 ENCOUNTER — OFFICE VISIT (OUTPATIENT)
Dept: FAMILY MEDICINE | Facility: CLINIC | Age: 55
End: 2025-03-11
Payer: COMMERCIAL

## 2025-03-11 ENCOUNTER — ANCILLARY PROCEDURE (OUTPATIENT)
Dept: GENERAL RADIOLOGY | Facility: CLINIC | Age: 55
End: 2025-03-11
Payer: COMMERCIAL

## 2025-03-11 ENCOUNTER — TELEPHONE (OUTPATIENT)
Dept: FAMILY MEDICINE | Facility: CLINIC | Age: 55
End: 2025-03-11

## 2025-03-11 VITALS
TEMPERATURE: 98.2 F | HEART RATE: 93 BPM | HEIGHT: 60 IN | DIASTOLIC BLOOD PRESSURE: 76 MMHG | RESPIRATION RATE: 16 BRPM | SYSTOLIC BLOOD PRESSURE: 119 MMHG | OXYGEN SATURATION: 97 % | BODY MASS INDEX: 25.94 KG/M2 | WEIGHT: 132.12 LBS

## 2025-03-11 DIAGNOSIS — R06.2 WHEEZING: ICD-10-CM

## 2025-03-11 DIAGNOSIS — R06.02 SHORTNESS OF BREATH: ICD-10-CM

## 2025-03-11 DIAGNOSIS — J45.51 SEVERE PERSISTENT ASTHMA WITH EXACERBATION (H): ICD-10-CM

## 2025-03-11 DIAGNOSIS — J45.51 SEVERE PERSISTENT ASTHMA WITH EXACERBATION (H): Primary | ICD-10-CM

## 2025-03-11 PROCEDURE — 71046 X-RAY EXAM CHEST 2 VIEWS: CPT | Mod: TC | Performed by: RADIOLOGY

## 2025-03-11 RX ORDER — FLUTICASONE PROPIONATE AND SALMETEROL 250; 50 UG/1; UG/1
1 POWDER RESPIRATORY (INHALATION) EVERY 12 HOURS
Qty: 60 EACH | Refills: 2 | Status: SHIPPED | OUTPATIENT
Start: 2025-03-11 | End: 2025-04-10

## 2025-03-11 RX ORDER — METHYLPREDNISOLONE 4 MG/1
TABLET ORAL
Qty: 21 TABLET | Refills: 0 | Status: SHIPPED | OUTPATIENT
Start: 2025-03-11

## 2025-03-11 NOTE — PROGRESS NOTES
Assessment & Plan     Severe persistent asthma with exacerbation (H)  Wheezing  Shortness of breath    - Mild improvement since visit last month.  Asthma still persistent every day with needing to take rescue inhaler multiple times each day for relief.  Was unable to obtain Advair refill due to insurance coverage.  Resent today with prior authorization completed.  - Due to length of illness chest x-ray today to rule out pneumonia.  Although this was clear with o infiltrates, effusions, pneumothoraces, cardiomegaly or masses when compared to previous.  Awaiting final reading.  -Medrol Dosepak ordered to assist with acute inflammation.  - Recommend follow-up with PCP for annual visit and ongoing management if symptoms do not resolve with plan outlined above.  Would potentially consider pulmonary function testing as it appears this has not been completed in the past.    - fluticasone-salmeterol (ADVAIR) 250-50 MCG/ACT inhaler  Dispense: 60 each; Refill: 2  - XR Chest 2 Views  - methylPREDNISolone (MEDROL DOSEPAK) 4 MG tablet therapy pack  Dispense: 21 tablet; Refill: 0    Please seek immediate medical attention (go to the emergency room or urgent care) if symptoms worsen, or for concerning changes.    Follow-up with PCP for annual visit , with PCP for ongoing management, if symptoms do not improve as anticipated, and as needed for acute concern               Phillip Hernandez is a 54 year old, presenting for the following health issues:  Asthma (Follow up )        3/11/2025    10:56 AM   Additional Questions   Roomed by Amarilis BLACK   Accompanied by Self     History of Present Illness     Asthma:  He presents for follow up of asthma.  He has no cough, some wheezing, and some shortness of breath.  He is using a relief medication every 4 hours. He does not miss any doses of his controller medication throughout the week. Patient is aware of the following triggers: cold air, exercise or sports, humidity, smoke and strong  "odors and fumes. The patient has not had a visit to the Emergency Room, Urgent Care or Hospital due to asthma since the last clinic visit.     Reason for visit:  Asthma    He eats 0-1 servings of fruits and vegetables daily.He consumes 1 sweetened beverage(s) daily.He exercises with enough effort to increase his heart rate 10 to 19 minutes per day.  He exercises with enough effort to increase his heart rate 3 or less days per week.   He is taking medications regularly.          Asthma has overall been worse this year than previously. Did visit clinic around one month ago with advair 250-50 ordered along with 7 days prednisone course. His insurance did not cover advair. This was was helpful . Does have extensive history of flair-ups at least 2 times annually over the last few years.    Today feels that breathing is equally bad to last month when he visits. Shortness of breath with an activity.  Was unable to obtain Advair refilled due to insurance noncoverage.  States that this was helpful in the past.          Objective    /76   Pulse 93   Temp 98.2  F (36.8  C) (Oral)   Resp 16   Ht 1.448 m (4' 9\")   Wt 59.9 kg (132 lb 1.9 oz)   SpO2 97%   BMI 28.59 kg/m    Body mass index is 28.59 kg/m .  Physical Exam   GENERAL: alert and no distress  RESP: lungs clear to auscultation - no rales, rhonchi or wheezes and expiratory wheezes bilateral  CV: regular rate and rhythm, normal S1 S2, no S3 or S4, no murmur, click or rub, no peripheral edema  MS: no gross musculoskeletal defects noted, no edema  PSYCH: mentation appears normal, affect normal/bright    CXR - Reviewed and interpreted by me Normal- no infiltrates, effusions, pneumothoraces, cardiomegaly or masses        Signed Electronically by: SONAL Camp CNP    "

## 2025-03-11 NOTE — PATIENT INSTRUCTIONS
Thank you for choosing the [facility], it was a pleasure to see you today!    Please take a look at the information below for more specific details regarding the treatment plan and recommendations.    -In this after visit summary is a list of your medications and specific instructions.  Please review this carefully as there may be changes made to your medication list.  -If there are any particular questions or concerns, please feel free to reach out.  -If any labs have been completed, we will reach out to you about results.  If the results are normal or not concerning, a letter or [employer] message will be sent to you.  If any follow-up is needed, either RJ or the nurse will give you a call.  If you have not heard regarding results after 2 weeks, please reach out to the clinic.    Patient Instructions:    - Chest xray today negative for pneumonia     - medications sent to your pharmacy     Please seek immediate medical attention (go to the emergency room or urgent care) for the following reasons: worsening symptoms, increased shortness of breath, or any concerning changes.      --------------------------------------------------------------------------------------------------------------------    -We are always looking for ways to improve.  You may be selected to receive a survey regarding your visit today.  We encourage you to complete the survey and provide specific, constructive feedback to help us improve our processes. If you answer the survey/phone call this will also prompt them to STOP calling you, otherwise they will try again each day for several day! Thank you for your time!    -Please review the contact information listed on the after visit summary and in the electronic chart.  Below is the phone number that we have on file.  If there are any changes that are needed to be made, please reach out to the clinic.  [phone] (home)

## 2025-03-12 NOTE — TELEPHONE ENCOUNTER
[facility]ail Pharmacy Prior Authorization Team  Phone: [phone]    PRIOR AUTHORIZATION DENIED    Medication: FLUTICASONE-SALMETEROL 250-50 MCG/ACT IN AEPB  Insurance Company: [employer] ([facility]) - Phone [phone] Fax [phone]  Denial Date: 3/11/2025  Denial Reason(s):     BRAND IS COVERED     Appeal Information: N/A  Patient Notified: NO- Unfortunately, we cannot call the patient with denials because we do not know what next steps the MD will take nor can we give medical advice, please notify the patient of what they are to expect for the continuation of their therapy from the provider.

## 2025-03-28 ENCOUNTER — HOSPITAL ENCOUNTER (EMERGENCY)
Facility: HOSPITAL | Age: 55
Discharge: HOME | End: 2025-03-28
Attending: EMERGENCY MEDICINE | Admitting: EMERGENCY MEDICINE
Payer: COMMERCIAL

## 2025-03-28 VITALS
BODY MASS INDEX: 28.87 KG/M2 | TEMPERATURE: 98.2 F | RESPIRATION RATE: 25 BRPM | DIASTOLIC BLOOD PRESSURE: 74 MMHG | SYSTOLIC BLOOD PRESSURE: 132 MMHG | HEART RATE: 113 BPM | OXYGEN SATURATION: 95 % | WEIGHT: 133.4 LBS

## 2025-03-28 DIAGNOSIS — R06.02 SHORTNESS OF BREATH: ICD-10-CM

## 2025-03-28 DIAGNOSIS — J45.21 MILD INTERMITTENT ASTHMA WITH ACUTE EXACERBATION: ICD-10-CM

## 2025-03-28 LAB
FLUAV RNA SPEC QL NAA+PROBE: NEGATIVE
FLUBV RNA RESP QL NAA+PROBE: NEGATIVE
RSV RNA SPEC NAA+PROBE: NEGATIVE
SARS-COV-2 RNA RESP QL NAA+PROBE: NEGATIVE

## 2025-03-28 PROCEDURE — 94640 AIRWAY INHALATION TREATMENT: CPT

## 2025-03-28 PROCEDURE — 96375 TX/PRO/DX INJ NEW DRUG ADDON: CPT

## 2025-03-28 PROCEDURE — 250N000011 HC RX IP 250 OP 636: Performed by: EMERGENCY MEDICINE

## 2025-03-28 PROCEDURE — 87637 SARSCOV2&INF A&B&RSV AMP PRB: CPT | Performed by: EMERGENCY MEDICINE

## 2025-03-28 PROCEDURE — 250N000009 HC RX 250: Performed by: EMERGENCY MEDICINE

## 2025-03-28 PROCEDURE — 96365 THER/PROPH/DIAG IV INF INIT: CPT

## 2025-03-28 PROCEDURE — 99285 EMERGENCY DEPT VISIT HI MDM: CPT | Mod: 25

## 2025-03-28 RX ORDER — ALBUTEROL SULFATE 0.83 MG/ML
5 SOLUTION RESPIRATORY (INHALATION) ONCE
Status: COMPLETED | OUTPATIENT
Start: 2025-03-28 | End: 2025-03-28

## 2025-03-28 RX ORDER — METHYLPREDNISOLONE SODIUM SUCCINATE 125 MG/2ML
125 INJECTION INTRAMUSCULAR; INTRAVENOUS ONCE
Status: COMPLETED | OUTPATIENT
Start: 2025-03-28 | End: 2025-03-28

## 2025-03-28 RX ORDER — IPRATROPIUM BROMIDE AND ALBUTEROL SULFATE 2.5; .5 MG/3ML; MG/3ML
3 SOLUTION RESPIRATORY (INHALATION) ONCE
Status: COMPLETED | OUTPATIENT
Start: 2025-03-28 | End: 2025-03-28

## 2025-03-28 RX ORDER — MAGNESIUM SULFATE HEPTAHYDRATE 40 MG/ML
2 INJECTION, SOLUTION INTRAVENOUS ONCE
Status: COMPLETED | OUTPATIENT
Start: 2025-03-28 | End: 2025-03-28

## 2025-03-28 RX ORDER — PREDNISONE 20 MG/1
40 TABLET ORAL DAILY
Qty: 10 TABLET | Refills: 0 | Status: SHIPPED | OUTPATIENT
Start: 2025-03-28 | End: 2025-04-02

## 2025-03-28 RX ORDER — IPRATROPIUM BROMIDE AND ALBUTEROL SULFATE 2.5; .5 MG/3ML; MG/3ML
1 SOLUTION RESPIRATORY (INHALATION) EVERY 6 HOURS PRN
Qty: 180 ML | Refills: 0 | Status: SHIPPED | OUTPATIENT
Start: 2025-03-28 | End: 2025-04-07

## 2025-03-28 RX ADMIN — MAGNESIUM SULFATE HEPTAHYDRATE 2 G: 40 INJECTION, SOLUTION INTRAVENOUS at 04:37

## 2025-03-28 RX ADMIN — ALBUTEROL SULFATE 5 MG: 2.5 SOLUTION RESPIRATORY (INHALATION) at 05:31

## 2025-03-28 RX ADMIN — METHYLPREDNISOLONE SODIUM SUCCINATE 125 MG: 125 INJECTION, POWDER, FOR SOLUTION INTRAMUSCULAR; INTRAVENOUS at 04:34

## 2025-03-28 RX ADMIN — IPRATROPIUM BROMIDE AND ALBUTEROL SULFATE 3 ML: .5; 3 SOLUTION RESPIRATORY (INHALATION) at 04:56

## 2025-03-28 RX ADMIN — IPRATROPIUM BROMIDE AND ALBUTEROL SULFATE 3 ML: .5; 3 SOLUTION RESPIRATORY (INHALATION) at 04:41

## 2025-03-28 ASSESSMENT — ACTIVITIES OF DAILY LIVING (ADL)
ADLS_ACUITY_SCORE: 41
ADLS_ACUITY_SCORE: 41

## 2025-03-28 ASSESSMENT — COLUMBIA-SUICIDE SEVERITY RATING SCALE - C-SSRS
1. IN THE PAST MONTH, HAVE YOU WISHED YOU WERE DEAD OR WISHED YOU COULD GO TO SLEEP AND NOT WAKE UP?: NO
6. HAVE YOU EVER DONE ANYTHING, STARTED TO DO ANYTHING, OR PREPARED TO DO ANYTHING TO END YOUR LIFE?: NO
2. HAVE YOU ACTUALLY HAD ANY THOUGHTS OF KILLING YOURSELF IN THE PAST MONTH?: NO

## 2025-03-28 NOTE — ED PROVIDER NOTES
EMERGENCY DEPARTMENT ENCOUNTER      NAME: Mary Arguello  AGE: 54 year old male  YOB: 1970  EVALUATION DATE & TIME: 3/28/2025  4:07 AM    ED PROVIDER: Rafaela Gamino MD    Chief Complaint   Patient presents with    Asthma Exacerbation       FINAL IMPRESSION  1. Mild intermittent asthma with acute exacerbation    2. Shortness of breath        MEDICAL DECISION MAKING   Mary Arguello is a 54 year old male who presents for evaluation of an asthma exacerbation.  Outside records reviewed.  Patient has a history of intermittent asthma and has been seen by pulmonology in the past, most recently in 2021.  He was seen in the office on 3/11/2025 for follow-up regarding asthma and I reviewed that note.  Today, he presents for evaluation of difficulty breathing.  He reports that this has been ongoing for the past week but has been progressively worsening.  He complains of shortness of breath, chest tightness, wheezing, and a mild cough.  He has tried using his home respiratory medications without any improvement.  He is not sure exactly what triggered his symptoms.  Patient denies associated fever, chest pain, abdominal pain, emesis.  Vitals on arrival notable for tachycardia, tachypnea, and hypoxia with O2 sats of 91% on room air.    I considered a broad differential including but not limited to asthma exacerbation, COPD exacerbation, viral URI, pneumonia, bronchitis, pulmonary edema. Given the patient's history and exam findings, symptoms seem most likely secondary to exacerbation of reactive airway disease, possibly precipitated by weather and seasonal changes. Patient agrees and reports symptoms are consistent with past exacerbations. Symptoms were not sudden in onset and there is no pleuritic CP, hypoxia, tachypnea, or back pain to suggest PE or dissection.  It would be very atypical for ACS and I do not feel further workup of this would be indicated.  No evidence for CHF.  Discussed options for workup management  "with the patient.  We have agreed on plan for viral swabs, chest x-ray, and management of symptoms with steroids, DuoNeb x 3, magnesium.    Viral swabs all negative.  I rechecked the patient and reviewed results.  He had significant improvement in symptoms after DuoNeb x 3 but did continue to have some audible expiratory wheezing bilaterally.  He was interested in another nebulizer while magnesium is infusing but ultimately, would like to go home today as he states that he has \"stuff to do\" on reevaluation after albuterol, he had further improvement in symptoms and was eager to go home.  I recommended a course of steroids and that he continue nebulizers and inhalers and follow-up closely with primary care provider.  Has resolved after treatment here.  Patient was not interested in he chest x-ray that was originally ordered, nor did he want to stay in the hospital.     At the end of the encounter, we reviewed the results, potential diagnoses, as well as return precautions and recommendations for follow up. I instructed Mr. Arguello to return to the emergency department immediately if he develops any new or worsening symptoms and provided additional verbal discharge instructions. Mr. Arguello expressed understanding and agreement with this plan of care, his questions were answered, and he was discharged in stable condition.      Medical Decision Making  I obtained history from Family Member/Significant Other, I reviewed the EMR pulmonology and primary care visits, Care impacted by Chronic Lung Disease and Heart Disease, and I considered additional work up, including chest x-ray, labs, but deferred due to shared decision making and patient preference  Discharge. I prescribed additional prescription strength medication(s) as charted. I recommended admission, but the patient declined.    MIPS (CTPE, Dental pain, Cabrera, Sinusitis, Asthma/COPD, Head Trauma): Asthma or COPD Exacerbation:Smoking Cessation Counseling: Patient is " NOT a current smoker.    SEPSIS: None      Critical care: 45 minutes excluding separately billable procedures.  Includes bedside management, time reviewing test results, review of records, discussing the case with staff, documenting the medical record and time spent with family members (or surrogate decision makers) discussing specific treatment issues.       ED COURSE  4:09 AM I met with the patient, obtained history, performed an initial exam, and discussed options and plan for diagnostics and treatment here in the ED.  5:20 AM I rechecked the patient.   5:54 AM I rechecked the patient.     MEDICATIONS GIVEN IN THE ED  Medications   methylPREDNISolone Na Suc (solu-MEDROL) injection 125 mg (125 mg Intravenous $Given 3/28/25 0434)   magnesium sulfate 2 g in 50 mL sterile water intermittent infusion (0 g Intravenous Stopped 3/28/25 0526)   ipratropium - albuterol 0.5 mg/2.5 mg/3 mL (DUONEB) neb solution 3 mL (3 mLs Nebulization $Given 3/28/25 0456)   ipratropium - albuterol 0.5 mg/2.5 mg/3 mL (DUONEB) neb solution 3 mL (3 mLs Nebulization $Given 3/28/25 0456)   ipratropium - albuterol 0.5 mg/2.5 mg/3 mL (DUONEB) neb solution 3 mL (3 mLs Nebulization $Given 3/28/25 0441)   albuterol (PROVENTIL) neb solution 5 mg (5 mg Nebulization $Given 3/28/25 0531)       NEW PRESCRIPTIONS STARTED AT TODAY'S VISIT  New Prescriptions    IPRATROPIUM - ALBUTEROL 0.5 MG/2.5 MG/3 ML (DUONEB) 0.5-2.5 (3) MG/3ML NEB SOLUTION    Take 1 vial (3 mLs) by nebulization every 6 hours as needed for shortness of breath, wheezing or cough.    PREDNISONE (DELTASONE) 20 MG TABLET    Take 2 tablets (40 mg) by mouth daily for 5 days.          =================================================================    HPI:    Use of : N/A      Mary Arguello is a 54 year old male who presents for evaluation of an asthma exacerbation.  Patient reports 1 week history of progressively worsening symptoms.  He complains of a mild cough, shortness of breath,  chest tightness, and wheezing.  He has tried using his home breathing treatments without improvement.  He has not had any associated fever, chills, chest pain, abdominal pain, vomiting, leg pain or leg swelling.  No recent travel or sick contacts.  He reports that in the past, his exacerbations have sometimes been triggered by changes in the weather.      RELEVANT HISTORY, MEDICATIONS, & ALLERGIES   Past medical history, surgical history, family history, medications, and allergies reviewed and pertinent noted in HPI.    REVIEW OF SYSTEMS:  A complete review of systems was performed with pertinent positives and negatives noted in the HPI.     PHYSICAL EXAM:    Vitals: BP (!) 143/82   Pulse (!) 121   Temp 97  F (36.1  C) (Temporal)   Resp 26   Wt 60.5 kg (133 lb 6.4 oz)   SpO2 93%   BMI 28.87 kg/m     General: Alert and interactive, comfortable appearing.  HENT: Atraumatic. Full AROM of neck. MMM.  Cardiovascular: Regular rate and rhythm.   Chest/Pulmonary: Normal work of breathing. Speaking in complete sentences. Lungs CTAB. No chest wall tenderness or deformities.  Abdomen: Soft, nondistended. Nontender without guarding or rebound.  Extremities: Normal AROM of all major joints.  Skin: Warm and dry. Normal skin color.   Neuro: Speech clear. CNs grossly intact. Moves all extremities spontaneously.   Psych: Normal affect/mood, cooperative, memory appropriate.      LAB  Labs Ordered and Resulted from Time of ED Arrival to Time of ED Departure   INFLUENZA A/B, RSV AND SARS-COV2 PCR - Normal       Result Value    Influenza A PCR Negative      Influenza B PCR Negative      RSV PCR Negative      SARS CoV2 PCR Negative         RADIOLOGY  No orders to display       Rafaela Gamino M.D.  Emergency Medicine  [facility] EMERGENCY DEPARTMENT  [facility] [facility] [address]  [phone]  Dept: [phone]      Rafaela Gamino MD  03/28/25 4554       Rafaela Gamino  MD WAYNE  03/28/25 0748

## 2025-03-28 NOTE — ED NOTES
Patient discharged at 0613 to home; family providing transportation. Patient provided with all discharge paperwork and educational material. All questions answered to patient's satisfaction.

## 2025-03-28 NOTE — DISCHARGE INSTRUCTIONS
You were seen in the Emergency Department today for shortness of breath.  I think your symptoms are most likely related to an asthma exacerbation.    Your viral swabs were negative.     You were treated here with steroids and nebulizers.  I am sending with a prescription for a short course of oral steroids and also a refill of your nebulizer.  You should continue to take all of your other medications as prescribed.      Please return to the ER if you experience more shortness of breath, chest pain, fever not better with Tylenol/motrin, and/or for any other new or concerning symptoms, otherwise please follow up with your primary doctor in 2-3 days for recheck.     Below is some information you might find useful.     Thank you for choosing [facility]. It was a pleasure taking care of you today!  - Dr. Rafaela Gamino

## 2025-04-28 ENCOUNTER — OFFICE VISIT (OUTPATIENT)
Dept: FAMILY MEDICINE | Facility: CLINIC | Age: 55
End: 2025-04-28
Payer: COMMERCIAL

## 2025-04-28 VITALS
OXYGEN SATURATION: 95 % | BODY MASS INDEX: 26 KG/M2 | HEIGHT: 60 IN | WEIGHT: 132.4 LBS | HEART RATE: 102 BPM | RESPIRATION RATE: 14 BRPM | TEMPERATURE: 98.4 F | DIASTOLIC BLOOD PRESSURE: 81 MMHG | SYSTOLIC BLOOD PRESSURE: 119 MMHG

## 2025-04-28 DIAGNOSIS — J45.51 SEVERE PERSISTENT ASTHMA WITH ACUTE EXACERBATION (H): Primary | ICD-10-CM

## 2025-04-28 PROCEDURE — G2211 COMPLEX E/M VISIT ADD ON: HCPCS

## 2025-04-28 PROCEDURE — 3074F SYST BP LT 130 MM HG: CPT

## 2025-04-28 PROCEDURE — 99215 OFFICE O/P EST HI 40 MIN: CPT

## 2025-04-28 PROCEDURE — 3079F DIAST BP 80-89 MM HG: CPT

## 2025-04-28 RX ORDER — BUDESONIDE AND FORMOTEROL FUMARATE DIHYDRATE 80; 4.5 UG/1; UG/1
2 AEROSOL RESPIRATORY (INHALATION) 2 TIMES DAILY
Qty: 10.2 G | Refills: 2 | Status: SHIPPED | OUTPATIENT
Start: 2025-04-28 | End: 2025-04-28

## 2025-04-28 RX ORDER — DEXAMETHASONE 6 MG/1
6 TABLET ORAL DAILY
Qty: 2 TABLET | Refills: 0 | Status: SHIPPED | OUTPATIENT
Start: 2025-04-28 | End: 2025-04-30

## 2025-04-28 RX ORDER — IPRATROPIUM BROMIDE AND ALBUTEROL SULFATE 2.5; .5 MG/3ML; MG/3ML
1 SOLUTION RESPIRATORY (INHALATION) EVERY 6 HOURS PRN
Qty: 180 ML | Refills: 0 | Status: SHIPPED | OUTPATIENT
Start: 2025-04-28

## 2025-04-28 RX ORDER — BUDESONIDE AND FORMOTEROL FUMARATE DIHYDRATE 80; 4.5 UG/1; UG/1
2 AEROSOL RESPIRATORY (INHALATION) 2 TIMES DAILY
Qty: 10.2 G | Refills: 2 | Status: SHIPPED | OUTPATIENT
Start: 2025-04-28

## 2025-04-28 ASSESSMENT — ASTHMA QUESTIONNAIRES
QUESTION_3 LAST FOUR WEEKS HOW OFTEN DID YOUR ASTHMA SYMPTOMS (WHEEZING, COUGHING, SHORTNESS OF BREATH, CHEST TIGHTNESS OR PAIN) WAKE YOU UP AT NIGHT OR EARLIER THAN USUAL IN THE MORNING: FOUR OR MORE NIGHTS A WEEK
QUESTION_5 LAST FOUR WEEKS HOW WOULD YOU RATE YOUR ASTHMA CONTROL: NOT CONTROLLED AT ALL
HOSPITALIZATION_OVERNIGHT_LAST_YEAR_TOTAL: TWO
QUESTION_4 LAST FOUR WEEKS HOW OFTEN HAVE YOU USED YOUR RESCUE INHALER OR NEBULIZER MEDICATION (SUCH AS ALBUTEROL): THREE OR MORE TIMES PER DAY
ACT_TOTALSCORE: 5
QUESTION_1 LAST FOUR WEEKS HOW MUCH OF THE TIME DID YOUR ASTHMA KEEP YOU FROM GETTING AS MUCH DONE AT WORK, SCHOOL OR AT HOME: ALL OF THE TIME
QUESTION_2 LAST FOUR WEEKS HOW OFTEN HAVE YOU HAD SHORTNESS OF BREATH: MORE THAN ONCE A DAY
EMERGENCY_ROOM_LAST_YEAR_TOTAL: TWO

## 2025-04-28 NOTE — PATIENT INSTRUCTIONS
Thank you for choosing the [facility], it was a pleasure to see you today!    Please take a look at the information below for more specific details regarding the treatment plan and recommendations.    -In this after visit summary is a list of your medications and specific instructions.  Please review this carefully as there may be changes made to your medication list.  -If there are any particular questions or concerns, please feel free to reach out.  -If any labs have been completed, we will reach out to you about results.  If the results are normal or not concerning, a letter or [employer] message will be sent to you.  If any follow-up is needed, either RJ or the nurse will give you a call.  If you have not heard regarding results after 2 weeks, please reach out to the clinic.           Please seek immediate medical attention (go to the emergency room or urgent care) for the following reasons: worsening symptoms or any concerning changes.      --------------------------------------------------------------------------------------------------------------------    -We are always looking for ways to improve.  You may be selected to receive a survey regarding your visit today.  We encourage you to complete the survey and provide specific, constructive feedback to help us improve our processes. If you answer the survey/phone call this will also prompt them to STOP calling you, otherwise they will try again each day for several day! Thank you for your time!    -Please review the contact information listed on the after visit summary and in the electronic chart.  Below is the phone number that we have on file.  If there are any changes that are needed to be made, please reach out to the clinic.  [phone] (home)

## 2025-04-28 NOTE — PROGRESS NOTES
Assessment & Plan    Severe persistent asthma with acute exacerbation (H)  - The diagnosis of severe persistent asthma is supported by the patient's daily use of a rescue inhaler and the presence of wheezing on examination.  - Prescribe Symbicort inhaler as Advair has not ever been able to be filled even after prior authorization. Refill DuoNeb nebulizers. Monitor insurance coverage and contact clinic if issues persist.  - Short course of dexamethasone, 2 pills in the morning for two days due to current exacerbation.   - Would consider alternative treatments, PFT, or referral to a pulmonology if symptoms do not improve with the current management plan.       - budesonide-formoterol (SYMBICORT/BREYNA) 80-4.5 MCG/ACT Inhaler  Dispense: 10.2 g; Refill: 2  - dexAMETHasone (DECADRON) 6 MG tablet  Dispense: 2 tablet; Refill: 0  - ipratropium - albuterol 0.5 mg/2.5 mg/3 mL (DUONEB) 0.5-2.5 (3) MG/3ML neb solution  Dispense: 180 mL; Refill: 0      Please seek immediate medical attention (go to the emergency room or urgent care) if symptoms worser or for concerning changes.    Follow-up if symptoms do not improve as anticipated, as needed for acute concern, and May schedule follow-up with me for asthma and medications ordered today  if unable to see PCP due to scheduling availability     ---------------------------------------------------------------------------------------   Subjective   Mray is a 54 year old year old male, presenting for the following health issues:  Chief Complaint   Patient presents with    Asthma         4/28/2025    12:45 PM   Additional Questions   Roomed by vin Huertaedith Arguello, a 54-year-old male, reports worsening symptoms related to his asthma. He experiences significant dizziness and difficulty breathing when lying down, accompanied by wheezing. He has been using a rescue inhaler daily. Has clinic for the last 2 months each month requesting send new order of Advair to clinic as  "insurance would not approve.  Prior Auth written x 2 and still will not cover.  Has not also had ED visit due to asthma exacerbation. He has been hospitalized for asthma exacerbations, during which nebulizers were administered, providing relief. Despite attempts to obtain a covered inhaler, insurance issues persist, and prior authorization has not resolved the problem. He has been using nebulizers at home, finding them more effective than other treatments. He used a nebulizer last night to manage his symptoms.        Patient submitted visit information    ---------------------------------------------------------------------------------------   Objective    Vital signs: /81   Pulse 102   Temp 98.4  F (36.9  C) (Oral)   Resp 14   Ht 1.459 m (4' 9.44\")   Wt 60.1 kg (132 lb 6.4 oz)   SpO2 95%   BMI 28.21 kg/m      Body mass index is 28.21 kg/m .    Physical Exam       General: Alert and oriented, in NAD.  Eyes: PERRL, EOMI, sclera normal.  Heart: Normal S1 and S2, regular rhythm. No murmurs, gallops, rubs.  Lungs: CTA bilaterally, no crackles, no rhonchi. Wheezing both inspiratory and expiratory in all fields  MSK: Normal ROM of upper and lower extremities.  Neuro: Alert and oriented x 3. Moves all extremities. No focal deficits noted.  Skin: Warm and well perfused, no rashes noted.  Psych: Normal mood and affect.    No results found for any visits on 04/28/25.  No results found for this or any previous visit (from the past 24 hours).    ---------------------------------------------------------------------------------------   Amount of time spent in chart review, direct patient contact, care coordination, and related activities to patient care on the day of appointment: 51 minutes.      Options for treatment and follow-up care were reviewed with the patient. Mary Arguello and/or guardian was engaged and actively involved in the decision making process. Mary Arguello and/or guardian verbalized understanding of " the options discussed and was satisfied with the final plan.    The longitudinal plan of care for the diagnosis(es)/condition(s) as documented were addressed during this visit. Due to the added complexity in care, I can continue to support Mary in the subsequent management and with ongoing continuity of care related to these items if unable to see (or establish with) PCP.       Patient consented to use of ambient AI scribe prior to initiation of visit.    Signed Electronically by: SONAL Camp CNP    Prior to immunization administration, verified patients identity using patient s name and date of birth. Please see Immunization Activity for additional information.     Screening Questionnaire for Adult Immunization    Are you sick today?   No   Do you have allergies to medications, food, a vaccine component or latex?   Yes   Have you ever had a serious reaction after receiving a vaccination?   No   Do you have a long-term health problem with heart, lung, kidney, or metabolic disease (e.g., diabetes), asthma, a blood disorder, no spleen, complement component deficiency, a cochlear implant, or a spinal fluid leak?  Are you on long-term aspirin therapy?   Yes   Do you have cancer, leukemia, HIV/AIDS, or any other immune system problem?   No   Do you have a parent, brother, or sister with an immune system problem?   No   In the past 3 months, have you taken medications that affect  your immune system, such as prednisone, other steroids, or anticancer drugs; drugs for the treatment of rheumatoid arthritis, Crohn s disease, or psoriasis; or have you had radiation treatments?   Yes   Have you had a seizure, or a brain or other nervous system problem?   No   During the past year, have you received a transfusion of blood or blood    products, or been given immune (gamma) globulin or antiviral drug?   No   For women: Are you pregnant or is there a chance you could become       pregnant during the next month?   No    Have you received any vaccinations in the past 4 weeks?   No     Immunization questionnaire was positive for at least one answer.  Notified RJ CNP .      Patient instructed to remain in clinic for 15 minutes afterwards, and to report any adverse reactions.     Screening performed by Lisa Sharma MA on 4/28/2025 at 12:50 PM.       Answers submitted by the patient for this visit:  General Questionnaire (Submitted on 4/28/2025)  Chief Complaint: Chronic problems general questions HPI Form  How many servings of fruits and vegetables do you eat daily?: 2-3  On average, how many sweetened beverages do you drink each day (Examples: soda, juice, sweet tea, etc.  Do NOT count diet or artificially sweetened beverages)?: 1  How many minutes a day do you exercise enough to make your heart beat faster?: 20 to 29  How many days a week do you exercise enough to make your heart beat faster?: 3 or less  How many days per week do you miss taking your medication?: 0  Questionnaire about: Chronic problems general questions HPI Form (Submitted on 4/28/2025)  Chief Complaint: Chronic problems general questions HPI Form

## 2025-06-16 ENCOUNTER — PATIENT OUTREACH (OUTPATIENT)
Dept: CARE COORDINATION | Facility: CLINIC | Age: 55
End: 2025-06-16
Payer: COMMERCIAL

## 2025-06-24 DIAGNOSIS — J45.21 MILD INTERMITTENT ASTHMA WITH ACUTE EXACERBATION: ICD-10-CM

## 2025-06-25 RX ORDER — ALBUTEROL SULFATE 0.83 MG/ML
SOLUTION RESPIRATORY (INHALATION)
Qty: 90 ML | Refills: 12 | Status: SHIPPED | OUTPATIENT
Start: 2025-06-25